# Patient Record
Sex: MALE | Race: WHITE | Employment: OTHER | URBAN - METROPOLITAN AREA
[De-identification: names, ages, dates, MRNs, and addresses within clinical notes are randomized per-mention and may not be internally consistent; named-entity substitution may affect disease eponyms.]

---

## 2017-05-17 ENCOUNTER — APPOINTMENT (OUTPATIENT)
Dept: CT IMAGING | Age: 62
End: 2017-05-17
Attending: EMERGENCY MEDICINE
Payer: OTHER MISCELLANEOUS

## 2017-05-17 ENCOUNTER — HOSPITAL ENCOUNTER (EMERGENCY)
Age: 62
Discharge: HOME | End: 2017-05-17
Attending: EMERGENCY MEDICINE
Payer: OTHER MISCELLANEOUS

## 2017-05-17 VITALS
RESPIRATION RATE: 20 BRPM | HEIGHT: 68 IN | OXYGEN SATURATION: 96 % | BODY MASS INDEX: 28.79 KG/M2 | TEMPERATURE: 98.3 F | SYSTOLIC BLOOD PRESSURE: 126 MMHG | DIASTOLIC BLOOD PRESSURE: 62 MMHG | WEIGHT: 190 LBS | HEART RATE: 86 BPM

## 2017-05-17 DIAGNOSIS — S39.012A BACK STRAIN, INITIAL ENCOUNTER: ICD-10-CM
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
## 2017-05-17 DIAGNOSIS — S20.222A BACK CONTUSION, LEFT, INITIAL ENCOUNTER: ICD-10-CM

## 2017-05-17 LAB
ALBUMIN SERPL BCP-MCNC: 4.1 G/DL (ref 3.2–4.6)
ALBUMIN/GLOB SERPL: 1.3 {RATIO} (ref 1.2–3.5)
ALP SERPL-CCNC: 56 U/L (ref 50–136)
ALT SERPL-CCNC: 36 U/L (ref 12–65)
ANION GAP BLD CALC-SCNC: 9 MMOL/L (ref 7–16)
AST SERPL W P-5'-P-CCNC: 24 U/L (ref 15–37)
ATRIAL RATE: 105 BPM
BASOPHILS # BLD AUTO: 0 K/UL (ref 0–0.2)
BASOPHILS # BLD: 0 % (ref 0–2)
BILIRUB SERPL-MCNC: 1 MG/DL (ref 0.2–1.1)
BUN SERPL-MCNC: 21 MG/DL (ref 8–23)
CALCIUM SERPL-MCNC: 9.5 MG/DL (ref 8.3–10.4)
CALCULATED P AXIS, ECG09: 64 DEGREES
CALCULATED R AXIS, ECG10: 68 DEGREES
CALCULATED T AXIS, ECG11: 67 DEGREES
CHLORIDE SERPL-SCNC: 106 MMOL/L (ref 98–107)
CO2 SERPL-SCNC: 28 MMOL/L (ref 21–32)
CREAT SERPL-MCNC: 1.04 MG/DL (ref 0.8–1.5)
DIAGNOSIS, 93000: NORMAL
DIFFERENTIAL METHOD BLD: ABNORMAL
EOSINOPHIL # BLD: 0.2 K/UL (ref 0–0.8)
EOSINOPHIL NFR BLD: 3 % (ref 0.5–7.8)
ERYTHROCYTE [DISTWIDTH] IN BLOOD BY AUTOMATED COUNT: 12.8 % (ref 11.9–14.6)
GLOBULIN SER CALC-MCNC: 3.2 G/DL (ref 2.3–3.5)
GLUCOSE SERPL-MCNC: 220 MG/DL (ref 65–100)
HCT VFR BLD AUTO: 42.4 % (ref 41.1–50.3)
HGB BLD-MCNC: 15 G/DL (ref 13.6–17.2)
IMM GRANULOCYTES # BLD: 0 K/UL (ref 0–0.5)
IMM GRANULOCYTES NFR BLD AUTO: 0.4 % (ref 0–5)
INR PPP: 0.9 (ref 0.9–1.2)
LYMPHOCYTES # BLD AUTO: 27 % (ref 13–44)
LYMPHOCYTES # BLD: 1.9 K/UL (ref 0.5–4.6)
MCH RBC QN AUTO: 31.1 PG (ref 26.1–32.9)
MCHC RBC AUTO-ENTMCNC: 35.4 G/DL (ref 31.4–35)
MCV RBC AUTO: 87.8 FL (ref 79.6–97.8)
MONOCYTES # BLD: 0.4 K/UL (ref 0.1–1.3)
MONOCYTES NFR BLD AUTO: 6 % (ref 4–12)
NEUTS SEG # BLD: 4.3 K/UL (ref 1.7–8.2)
NEUTS SEG NFR BLD AUTO: 64 % (ref 43–78)
P-R INTERVAL, ECG05: 122 MS
PLATELET # BLD AUTO: 239 K/UL (ref 150–450)
PMV BLD AUTO: 10.4 FL (ref 10.8–14.1)
POTASSIUM SERPL-SCNC: 4.2 MMOL/L (ref 3.5–5.1)
PROT SERPL-MCNC: 7.3 G/DL (ref 6.3–8.2)
PROTHROMBIN TIME: 10.3 SEC (ref 9.6–12)
Q-T INTERVAL, ECG07: 336 MS
QRS DURATION, ECG06: 90 MS
QTC CALCULATION (BEZET), ECG08: 444 MS
RBC # BLD AUTO: 4.83 M/UL (ref 4.23–5.67)
SODIUM SERPL-SCNC: 143 MMOL/L (ref 136–145)
VENTRICULAR RATE, ECG03: 105 BPM
WBC # BLD AUTO: 6.9 K/UL (ref 4.3–11.1)

## 2017-05-17 PROCEDURE — 81003 URINALYSIS AUTO W/O SCOPE: CPT | Performed by: EMERGENCY MEDICINE

## 2017-05-17 PROCEDURE — 96361 HYDRATE IV INFUSION ADD-ON: CPT | Performed by: EMERGENCY MEDICINE

## 2017-05-17 PROCEDURE — 74011250636 HC RX REV CODE- 250/636: Performed by: EMERGENCY MEDICINE

## 2017-05-17 PROCEDURE — 96375 TX/PRO/DX INJ NEW DRUG ADDON: CPT | Performed by: EMERGENCY MEDICINE

## 2017-05-17 PROCEDURE — 96374 THER/PROPH/DIAG INJ IV PUSH: CPT | Performed by: EMERGENCY MEDICINE

## 2017-05-17 PROCEDURE — 74011636320 HC RX REV CODE- 636/320: Performed by: EMERGENCY MEDICINE

## 2017-05-17 PROCEDURE — 74011250636 HC RX REV CODE- 250/636

## 2017-05-17 PROCEDURE — 80053 COMPREHEN METABOLIC PANEL: CPT | Performed by: EMERGENCY MEDICINE

## 2017-05-17 PROCEDURE — 85610 PROTHROMBIN TIME: CPT | Performed by: EMERGENCY MEDICINE

## 2017-05-17 PROCEDURE — 74011000258 HC RX REV CODE- 258: Performed by: EMERGENCY MEDICINE

## 2017-05-17 PROCEDURE — 85025 COMPLETE CBC W/AUTO DIFF WBC: CPT | Performed by: EMERGENCY MEDICINE

## 2017-05-17 PROCEDURE — 99285 EMERGENCY DEPT VISIT HI MDM: CPT | Performed by: EMERGENCY MEDICINE

## 2017-05-17 PROCEDURE — 93005 ELECTROCARDIOGRAM TRACING: CPT

## 2017-05-17 PROCEDURE — 71260 CT THORAX DX C+: CPT

## 2017-05-17 RX ORDER — LIDOCAINE 50 MG/G
PATCH TOPICAL
Qty: 15 EACH | Refills: 0 | Status: SHIPPED | OUTPATIENT
Start: 2017-05-17 | End: 2018-08-06

## 2017-05-17 RX ORDER — MORPHINE SULFATE 4 MG/ML
4 INJECTION, SOLUTION INTRAMUSCULAR; INTRAVENOUS
Status: COMPLETED | OUTPATIENT
Start: 2017-05-17 | End: 2017-05-17

## 2017-05-17 RX ORDER — SODIUM CHLORIDE 0.9 % (FLUSH) 0.9 %
10 SYRINGE (ML) INJECTION
Status: COMPLETED | OUTPATIENT
Start: 2017-05-17 | End: 2017-05-17

## 2017-05-17 RX ORDER — CYCLOBENZAPRINE HCL 10 MG
10 TABLET ORAL
Qty: 21 TAB | Refills: 0 | Status: SHIPPED | OUTPATIENT
Start: 2017-05-17 | End: 2018-08-06

## 2017-05-17 RX ORDER — DIAZEPAM 10 MG/2ML
5 INJECTION INTRAMUSCULAR
Status: COMPLETED | OUTPATIENT
Start: 2017-05-17 | End: 2017-05-17

## 2017-05-17 RX ADMIN — DIAZEPAM 5 MG: 5 INJECTION, SOLUTION INTRAMUSCULAR; INTRAVENOUS at 16:48

## 2017-05-17 RX ADMIN — MORPHINE SULFATE 4 MG: 4 INJECTION, SOLUTION INTRAMUSCULAR; INTRAVENOUS at 16:49

## 2017-05-17 RX ADMIN — Medication 10 ML: at 17:16

## 2017-05-17 RX ADMIN — SODIUM CHLORIDE 100 ML: 900 INJECTION, SOLUTION INTRAVENOUS at 17:16

## 2017-05-17 RX ADMIN — IOPAMIDOL 100 ML: 755 INJECTION, SOLUTION INTRAVENOUS at 17:16

## 2017-05-17 RX ADMIN — SODIUM CHLORIDE 1000 ML: 900 INJECTION, SOLUTION INTRAVENOUS at 16:49

## 2017-05-17 NOTE — ED TRIAGE NOTES
Patient arrives [employer] forklift hit him in lower back and knocked off feet. States tingling down left leg. Patient able to stand with assistance turn and sit on [facility] stretcher. Employees placed patient in chair.  bp-162/84 hr-110-120 98% on room air [id number]

## 2017-05-17 NOTE — Clinical Note
Expect to be stiff and sore the next couple of days If symptoms continue, call and arrange follow-up with spine center Take medications as prescribed

## 2017-05-17 NOTE — ED NOTES
Patient is resting on stretcher. Patient is on cardiac monitor, cycling vital signs, and continuous pulse ox. Patient denies needs at this time. Stretcher in low position. Side rails x 2 for safety. Call light within reach. Will continue to monitor. Bilateral pedal pulses are 2+ and marked.

## 2017-05-17 NOTE — ED NOTES
Patient returned from radiology. Patient is resting on stretcher. Patient is on cardiac monitor, cycling vital signs, and continuous pulse ox. Patient denies needs at this time. Stretcher in low position. Side rails x 2 for safety. Call light within reach. Will continue to monitor.

## 2017-05-17 NOTE — DISCHARGE INSTRUCTIONS
Back Strain: Care Instructions  Your Care Instructions    Back strain happens when you overstretch, or pull, a muscle in your back. You may hurt your back in an accident or when you exercise or lift something. Most back pain will get better with rest and time. You can take care of yourself at home to help your back heal.  Follow-up care is a key part of your treatment and safety. Be sure to make and go to all appointments, and call your doctor if you are having problems. It's also a good idea to know your test results and keep a list of the medicines you take. How can you care for yourself at home? · Try to stay as active as you can, but stop or reduce any activity that causes pain. · Put ice or a cold pack on the sore muscle for 10 to 20 minutes at a time to stop swelling. Try this every 1 to 2 hours for 3 days (when you are awake) or until the swelling goes down. Put a thin cloth between the ice pack and your skin. · After 2 or 3 days, apply a heating pad on low or a warm cloth to your back. Some doctors suggest that you go back and forth between hot and cold treatments. · Take pain medicines exactly as directed. ¨ If the doctor gave you a prescription medicine for pain, take it as prescribed. ¨ If you are not taking a prescription pain medicine, ask your doctor if you can take an over-the-counter medicine. · Try sleeping on your side with a pillow between your legs. Or put a pillow under your knees when you lie on your back. These measures can ease pain in your lower back. · Return to your usual level of activity slowly. When should you call for help? Call 911 anytime you think you may need emergency care. For example, call if:  · You are unable to move a leg at all. Call your doctor now or seek immediate medical care if:  · You have new or worse symptoms in your legs, belly, or buttocks. Symptoms may include:  ¨ Numbness or tingling. ¨ Weakness. ¨ Pain.   · You lose bladder or bowel control. Watch closely for changes in your health, and be sure to contact your doctor if you are not getting better as expected. Where can you learn more? Go to http://collette-rhys.info/. Enter X878 in the search box to learn more about \"Back Strain: Care Instructions. \"  Current as of: May 23, 2016  Content Version: 11.2  © 3533-4114 [employer]. Care instructions adapted under license by [employer] (which disclaims liability or warranty for this information). If you have questions about a medical condition or this instruction, always ask your healthcare professional. [place] [address] any warranty or liability for your use of this information. Preventing Falls: Care Instructions  Your Care Instructions  Getting around your home safely can be a challenge if you have injuries or health problems that make it easy for you to fall. Loose rugs and furniture in walkways are among the dangers for many older people who have problems walking or who have poor eyesight. People who have conditions such as arthritis, osteoporosis, or dementia also have to be careful not to fall. You can make your home safer with a few simple measures. Follow-up care is a key part of your treatment and safety. Be sure to make and go to all appointments, and call your doctor if you are having problems. It's also a good idea to know your test results and keep a list of the medicines you take. How can you care for yourself at home? Taking care of yourself  · You may get dizzy if you do not drink enough water. To prevent dehydration, drink plenty of fluids, enough so that your urine is light yellow or clear like water. Choose water and other caffeine-free clear liquids. If you have kidney, heart, or liver disease and have to limit fluids, talk with your doctor before you increase the amount of fluids you drink.   · Exercise regularly to improve your strength, muscle tone, and balance. Walk if you can. Swimming may be a good choice if you cannot walk easily. · Have your vision and hearing checked each year or any time you notice a change. If you have trouble seeing and hearing, you might not be able to avoid objects and could lose your balance. · Know the side effects of the medicines you take. Ask your doctor or pharmacist whether the medicines you take can affect your balance. Sleeping pills or sedatives can affect your balance. · Limit the amount of alcohol you drink. Alcohol can impair your balance and other senses. · Ask your doctor whether calluses or corns on your feet need to be removed. If you wear loose-fitting shoes because of calluses or corns, you can lose your balance and fall. · Talk to your doctor if you have numbness in your feet. Preventing falls at home  · Remove raised doorway thresholds, throw rugs, and clutter. Repair loose carpet or raised areas in the floor. · Move furniture and electrical cords to keep them out of walking paths. · Use nonskid floor wax, and wipe up spills right away, especially on ceramic tile floors. · If you use a walker or cane, put rubber tips on it. If you use crutches, clean the bottoms of them regularly with an abrasive pad, such as steel wool. · Keep your house well lit, especially Marshel Fess, and outside walkways. Use night-lights in areas such as hallways and bathrooms. Add extra light switches or use remote switches (such as switches that go on or off when you clap your hands) to make it easier to turn lights on if you have to get up during the night. · Install sturdy handrails on stairways. · Move items in your cabinets so that the things you use a lot are on the lower shelves (about waist level). · Keep a cordless phone and a flashlight with new batteries by your bed. If possible, put a phone in each of the main rooms of your house, or carry a cell phone in case you fall and cannot reach a phone.  Or, you can wear a device around your neck or wrist. You push a button that sends a signal for help. · Wear low-heeled shoes that fit well and give your feet good support. Use footwear with nonskid soles. Check the heels and soles of your shoes for wear. Repair or replace worn heels or soles. · Do not wear socks without shoes on wood floors. · Walk on the grass when the sidewalks are slippery. If you live in an area that gets snow and ice in the winter, sprinkle salt on slippery steps and sidewalks. Preventing falls in the bath  · Install grab bars and nonskid mats inside and outside your shower or tub and near the toilet and sinks. · Use shower chairs and bath benches. · Use a hand-held shower head that will allow you to sit while showering. · Get into a tub or shower by putting the weaker leg in first. Get out of a tub or shower with your strong side first.  · Repair loose toilet seats and consider installing a raised toilet seat to make getting on and off the toilet easier. · Keep your bathroom door unlocked while you are in the shower. Where can you learn more? Go to http://collette-rhys.info/. Enter 0476 [id number] 69 [id number] in the search box to learn more about \"Preventing Falls: Care Instructions. \"  Current as of: August 4, 2016  Content Version: 11.2  © 5688-9035 [employer]. Care instructions adapted under license by [employer] (which disclaims liability or warranty for this information). If you have questions about a medical condition or this instruction, always ask your healthcare professional. [place] [address] any warranty or liability for your use of this information. Learning About How to Have a Healthy Back  What causes back pain? Back pain is often caused by overuse, strain, or injury. For example, people often hurt their backs playing sports or working in the yard, being jolted in a car accident, or lifting something too heavy. Aging plays a part too.  Your bones and muscles tend to lose strength as you age, which makes injury more likely. The spongy discs between the bones of the spine (vertebrae) may suffer from wear and tear and no longer provide enough cushion between the bones. A disc that bulges or breaks open (herniated disc) can press on nerves, causing back pain. In some people, back pain is the result of arthritis, broken vertebrae caused by bone loss (osteoporosis), illness, or a spine problem. Although most people have back pain at one time or another, there are steps you can take to make it less likely. How can you have a healthy back? Reduce stress on your back through good posture  Slumping or slouching alone may not cause low back pain. But after the back has been strained or injured, bad posture can make pain worse. · Sleep in a position that maintains your back's normal curves and on a mattress that feels comfortable. Sleep on your side with a pillow between your knees, or sleep on your back with a pillow under your knees. These positions can reduce strain on your back. · Stand and sit up straight. \"Good posture\" generally means your ears, shoulders, and hips are in a straight line. · If you must stand for a long time, put one foot on a stool, ledge, or box. Switch feet every now and then. · Sit in a chair that is low enough to let you place both feet flat on the floor with both knees nearly level with your hips. If your chair or desk is too high, use a footrest to raise your knees. Place a small pillow, a rolled-up towel, or a lumbar roll in the curve of your back if you need extra support. · Try a kneeling chair, which helps tilt your hips forward. This takes pressure off your lower back. · Try sitting on an exercise ball. It can rock from side to side, which helps keep your back loose. · When driving, keep your knees nearly level with your hips. Sit straight, and drive with both hands on the steering wheel.  Your arms should be in a slightly bent position. Reduce stress on your back through careful lifting  · Squat down, bending at the hips and knees only. If you need to, put one knee to the floor and extend your other knee in front of you, bent at a right angle (half kneeling). · Press your chest straight forward. This helps keep your upper back straight while keeping a slight arch in your low back. · Hold the load as close to your body as possible, at the level of your belly button (navel). · Use your feet to change direction, taking small steps. · Lead with your hips as you change direction. Keep your shoulders in line with your hips as you move. · Set down your load carefully, squatting with your knees and hips only. Exercise and stretch your back  · Do some exercise on most days of the week, if your doctor says it is okay. You can walk, run, swim, or cycle. · Stretch your back muscles. Here are a few exercises to try:  Corinne Orn on your back, and gently pull one bent knee to your chest. Put that foot back on the floor, and then pull the other knee to your chest.  ¨ Do pelvic tilts. Lie on your back with your knees bent. Tighten your stomach muscles. Pull your belly button (navel) in and up toward your ribs. You should feel like your back is pressing to the floor and your hips and pelvis are slightly lifting off the floor. Hold for 6 seconds while breathing smoothly. ¨ Sit with your back flat against a wall. · Keep your core muscles strong. The muscles of your back, belly (abdomen), and buttocks support your spine. ¨ Pull in your belly and imagine pulling your navel toward your spine. Hold this for 6 seconds, then relax. Remember to keep breathing normally as you tense your muscles. ¨ Do curl-ups. Always do them with your knees bent. Keep your low back on the floor, and curl your shoulders toward your knees using a smooth, slow motion.  Keep your arms folded across your chest. If this bothers your neck, try putting your hands behind your neck (not your head), with your elbows spread apart. ¨ Lie on your back with your knees bent and your feet flat on the floor. Tighten your belly muscles, and then push with your feet and raise your buttocks up a few inches. Hold this position 6 seconds as you continue to breathe normally, then lower yourself slowly to the floor. Repeat 8 to 12 times. ¨ If you like group exercise, try Pilates or yoga. These classes have poses that strengthen the core muscles. Lead a healthy lifestyle  · Stay at a healthy weight to avoid strain on your back. · Do not smoke. Smoking increases the risk of osteoporosis, which weakens the spine. If you need help quitting, talk to your doctor about stop-smoking programs and medicines. These can increase your chances of quitting for good. Where can you learn more? Go to http://collette-rhys.info/. Enter L315 in the search box to learn more about \"Learning About How to Have a Healthy Back. \"  Current as of: May 23, 2016  Content Version: 11.2  © 9872-0202 [employer]. Care instructions adapted under license by [employer] (which disclaims liability or warranty for this information). If you have questions about a medical condition or this instruction, always ask your healthcare professional. [address] any warranty or liability for your use of this information.
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
## 2017-05-17 NOTE — LETTER
NOTIFICATION RETURN TO WORK / SCHOOL 
 
5/17/2017 7:07 PM 
 
Mr. Dahlia Chung [address] [place] [address] To Whom It May Concern: 
 
Dahlia Chung is currently under the care of [facility] EMERGENCY DEPT. He will return to work/school on: 5/19/2017 If there are questions or concerns please have the patient contact our office. Sincerely, Amanda Ramsey RN

## 2017-05-17 NOTE — ED NOTES
Discharge instructions, follow up, and prescriptions provided and explained to the pt, wife, and daughter. Opportunity for questions provided. A signed copy of AVS was placed in chart. Coco García

## 2017-05-23 ENCOUNTER — HOSPITAL ENCOUNTER (OUTPATIENT)
Dept: OCCUPATIONAL MEDICINE | Age: 62
Discharge: HOME | End: 2017-05-23

## 2017-05-23 DIAGNOSIS — T14.90XA INJURY, OTHER AND UNSPECIFIED, UNSPECIFIED SITE: ICD-10-CM

## 2017-12-18 ENCOUNTER — HOSPITAL ENCOUNTER (EMERGENCY)
Age: 62
Discharge: HOME | End: 2017-12-18
Attending: EMERGENCY MEDICINE
Payer: COMMERCIAL

## 2017-12-18 ENCOUNTER — APPOINTMENT (OUTPATIENT)
Dept: GENERAL RADIOLOGY | Age: 62
End: 2017-12-18
Attending: EMERGENCY MEDICINE
Payer: COMMERCIAL

## 2017-12-18 VITALS
SYSTOLIC BLOOD PRESSURE: 131 MMHG | HEIGHT: 68 IN | RESPIRATION RATE: 18 BRPM | DIASTOLIC BLOOD PRESSURE: 79 MMHG | TEMPERATURE: 98 F | OXYGEN SATURATION: 95 % | WEIGHT: 180 LBS | HEART RATE: 81 BPM | BODY MASS INDEX: 27.28 KG/M2

## 2017-12-18 DIAGNOSIS — R53.1 GENERALIZED WEAKNESS: ICD-10-CM

## 2017-12-18 DIAGNOSIS — R11.2 NAUSEA AND VOMITING, INTRACTABILITY OF VOMITING NOT SPECIFIED, UNSPECIFIED VOMITING TYPE: Primary | ICD-10-CM

## 2017-12-18 LAB
ALBUMIN SERPL-MCNC: 4.1 G/DL (ref 3.2–4.6)
ALBUMIN/GLOB SERPL: 1.2 {RATIO} (ref 1.2–3.5)
ALP SERPL-CCNC: 54 U/L (ref 50–136)
ALT SERPL-CCNC: 32 U/L (ref 12–65)
ANION GAP SERPL CALC-SCNC: 16 MMOL/L (ref 7–16)
AST SERPL-CCNC: 17 U/L (ref 15–37)
ATRIAL RATE: 76 BPM
BASOPHILS # BLD: 0 K/UL (ref 0–0.2)
BASOPHILS NFR BLD: 0 % (ref 0–2)
BILIRUB SERPL-MCNC: 0.9 MG/DL (ref 0.2–1.1)
BUN SERPL-MCNC: 19 MG/DL (ref 8–23)
CALCIUM SERPL-MCNC: 8.6 MG/DL (ref 8.3–10.4)
CALCULATED P AXIS, ECG09: 39 DEGREES
CALCULATED R AXIS, ECG10: 53 DEGREES
CALCULATED T AXIS, ECG11: 26 DEGREES
CHLORIDE SERPL-SCNC: 102 MMOL/L (ref 98–107)
CO2 SERPL-SCNC: 19 MMOL/L (ref 21–32)
CREAT SERPL-MCNC: 0.79 MG/DL (ref 0.8–1.5)
D DIMER PPP FEU-MCNC: 0.4 UG/ML(FEU)
DIAGNOSIS, 93000: NORMAL
DIFFERENTIAL METHOD BLD: ABNORMAL
EOSINOPHIL # BLD: 0.2 K/UL (ref 0–0.8)
EOSINOPHIL NFR BLD: 2 % (ref 0.5–7.8)
ERYTHROCYTE [DISTWIDTH] IN BLOOD BY AUTOMATED COUNT: 12.9 % (ref 11.9–14.6)
GLOBULIN SER CALC-MCNC: 3.4 G/DL (ref 2.3–3.5)
GLUCOSE SERPL-MCNC: 248 MG/DL (ref 65–100)
HCT VFR BLD AUTO: 44.2 % (ref 41.1–50.3)
HGB BLD-MCNC: 15.6 G/DL (ref 13.6–17.2)
IMM GRANULOCYTES # BLD: 0 K/UL (ref 0–0.5)
IMM GRANULOCYTES NFR BLD AUTO: 0 % (ref 0–5)
LIPASE SERPL-CCNC: 191 U/L (ref 73–393)
LYMPHOCYTES # BLD: 2.2 K/UL (ref 0.5–4.6)
LYMPHOCYTES NFR BLD: 29 % (ref 13–44)
MCH RBC QN AUTO: 32.1 PG (ref 26.1–32.9)
MCHC RBC AUTO-ENTMCNC: 35.3 G/DL (ref 31.4–35)
MCV RBC AUTO: 90.9 FL (ref 79.6–97.8)
MONOCYTES # BLD: 0.4 K/UL (ref 0.1–1.3)
MONOCYTES NFR BLD: 6 % (ref 4–12)
NEUTS SEG # BLD: 4.9 K/UL (ref 1.7–8.2)
NEUTS SEG NFR BLD: 63 % (ref 43–78)
P-R INTERVAL, ECG05: 132 MS
PLATELET # BLD AUTO: 258 K/UL (ref 150–450)
PMV BLD AUTO: 10.2 FL (ref 10.8–14.1)
POTASSIUM SERPL-SCNC: 4 MMOL/L (ref 3.5–5.1)
PROT SERPL-MCNC: 7.5 G/DL (ref 6.3–8.2)
Q-T INTERVAL, ECG07: 382 MS
QRS DURATION, ECG06: 94 MS
QTC CALCULATION (BEZET), ECG08: 429 MS
RBC # BLD AUTO: 4.86 M/UL (ref 4.23–5.67)
SODIUM SERPL-SCNC: 137 MMOL/L (ref 136–145)
TROPONIN I BLD-MCNC: 0 NG/ML (ref 0.02–0.05)
TROPONIN I SERPL-MCNC: <0.02 NG/ML (ref 0.02–0.05)
VENTRICULAR RATE, ECG03: 76 BPM
WBC # BLD AUTO: 7.6 K/UL (ref 4.3–11.1)

## 2017-12-18 PROCEDURE — 83690 ASSAY OF LIPASE: CPT | Performed by: EMERGENCY MEDICINE

## 2017-12-18 PROCEDURE — 85025 COMPLETE CBC W/AUTO DIFF WBC: CPT | Performed by: EMERGENCY MEDICINE

## 2017-12-18 PROCEDURE — 84484 ASSAY OF TROPONIN QUANT: CPT | Performed by: EMERGENCY MEDICINE

## 2017-12-18 PROCEDURE — 96376 TX/PRO/DX INJ SAME DRUG ADON: CPT | Performed by: EMERGENCY MEDICINE

## 2017-12-18 PROCEDURE — 85379 FIBRIN DEGRADATION QUANT: CPT | Performed by: EMERGENCY MEDICINE

## 2017-12-18 PROCEDURE — 74011250636 HC RX REV CODE- 250/636: Performed by: EMERGENCY MEDICINE

## 2017-12-18 PROCEDURE — 71020 XR CHEST PA LAT: CPT

## 2017-12-18 PROCEDURE — 80053 COMPREHEN METABOLIC PANEL: CPT | Performed by: EMERGENCY MEDICINE

## 2017-12-18 PROCEDURE — 99284 EMERGENCY DEPT VISIT MOD MDM: CPT | Performed by: EMERGENCY MEDICINE

## 2017-12-18 PROCEDURE — 96374 THER/PROPH/DIAG INJ IV PUSH: CPT | Performed by: EMERGENCY MEDICINE

## 2017-12-18 PROCEDURE — 93005 ELECTROCARDIOGRAM TRACING: CPT | Performed by: EMERGENCY MEDICINE

## 2017-12-18 RX ORDER — ONDANSETRON 2 MG/ML
4 INJECTION INTRAMUSCULAR; INTRAVENOUS
Status: COMPLETED | OUTPATIENT
Start: 2017-12-18 | End: 2017-12-18

## 2017-12-18 RX ORDER — ONDANSETRON 4 MG/1
4 TABLET, ORALLY DISINTEGRATING ORAL
Qty: 6 TAB | Refills: 0 | Status: SHIPPED | OUTPATIENT
Start: 2017-12-18 | End: 2018-08-06

## 2017-12-18 RX ORDER — PROMETHAZINE HYDROCHLORIDE 25 MG/1
25 TABLET ORAL
Qty: 12 TAB | Refills: 0 | Status: SHIPPED | OUTPATIENT
Start: 2017-12-18 | End: 2018-08-16

## 2017-12-18 RX ORDER — RANITIDINE 150 MG/1
150 TABLET, FILM COATED ORAL 2 TIMES DAILY
Qty: 30 TAB | Refills: 0 | Status: SHIPPED | OUTPATIENT
Start: 2017-12-18 | End: 2018-01-17

## 2017-12-18 RX ADMIN — ONDANSETRON 4 MG: 2 INJECTION INTRAMUSCULAR; INTRAVENOUS at 01:48

## 2017-12-18 RX ADMIN — ONDANSETRON 4 MG: 2 INJECTION INTRAMUSCULAR; INTRAVENOUS at 02:30

## 2017-12-18 NOTE — ED PROVIDER NOTES
HPI Comments: 65 on gentle and has a remote history of coronary artery disease, insulin dependent diabetes, hypothyroidism. He noticed nausea and lightheadedness and dizziness along with a general feeling of malaise and weakness when he went to bed. One episode of vomiting. Had some belching and felt like he tasted acid. No chest pain headache or abdominal pain. No diarrhea or dark colored bowel movements. No dysuria. No focal numbness or weakness. No syncope. No vertigo. His had no cough but does feel like he is short of breath. Has had some subjective fever and chills. Patient is a 65 y.o. male presenting with fatigue. The history is provided by the patient. Fatigue   This is a new problem. The current episode started 1 to 2 hours ago. The problem has not changed since onset. There was no focality noted. Pertinent negatives include no focal weakness, no loss of sensation, no loss of balance, no slurred speech and no speech difficulty. Patient reports a subjective fever - was not measured. Associated symptoms include shortness of breath, vomiting and nausea. Pertinent negatives include no chest pain and no headaches. Past Medical History:   Diagnosis Date    Benign hypertensive heart disease without heart failure 1/8/2016    Coronary atherosclerosis of native coronary vessel 1/8/2016    [facility] [address]     DM type 2 (diabetes mellitus, type 2) ([facility] [address] 1/8/2016    Dyspnea     Hypertension        Past Surgical History:   Procedure Laterality Date    ABDOMEN SURGERY PROC UNLISTED      GALLBLADDER    CARDIAC SURG PROCEDURE UNLIST      stents in 2009.     HX CHOLECYSTECTOMY           Family History:   Problem Relation Age of Onset    Heart Attack Mother     Heart Attack Father     Coronary Artery Disease Other      FAMILY HX       Social History     Social History    Marital status:      Spouse name: N/A    Number of children: N/A    Years of education: N/A     Occupational History  Not on file. Social History Main Topics    Smoking status: Never Smoker    Smokeless tobacco: Never Used    Alcohol use No    Drug use: Not on file    Sexual activity: Not on file     Other Topics Concern    Not on file     Social History Narrative         ALLERGIES: Plavix [clopidogrel]    Review of Systems   Constitutional: Positive for fatigue. Negative for chills and fever. Respiratory: Positive for shortness of breath. Negative for cough. Cardiovascular: Negative for chest pain and palpitations. Gastrointestinal: Positive for nausea and vomiting. Negative for abdominal pain and diarrhea. Genitourinary: Negative for dysuria and flank pain. Musculoskeletal: Negative for back pain and neck pain. Skin: Negative for color change and rash. Neurological: Negative for focal weakness, syncope, speech difficulty, headaches and loss of balance. All other systems reviewed and are negative. Vitals:    12/18/17 0018   BP: 157/74   Pulse: 79   Resp: 20   Temp: 97.7 °F (36.5 °C)   SpO2: 98%   Weight: 81.6 kg (180 lb)   Height: 5' 8\" (1.727 m)            Physical Exam   Constitutional: He is oriented to person, place, and time. He appears well-developed and well-nourished. No distress. HENT:   Head: Normocephalic and atraumatic. Mouth/Throat: Oropharynx is clear and moist. No oropharyngeal exudate. Eyes: Conjunctivae and EOM are normal. Pupils are equal, round, and reactive to light. Neck: Normal range of motion. Neck supple. Cardiovascular: Normal rate, regular rhythm and intact distal pulses. No murmur heard. Pulmonary/Chest: Breath sounds normal. No respiratory distress. Abdominal: Soft. Bowel sounds are normal. He exhibits no mass. There is no tenderness. There is no rebound and no guarding. No hernia. Neurological: He is alert and oriented to person, place, and time. Gait normal.   Nl speech  No nystagmus or drift. Ambulatory   Skin: Skin is warm and dry.    Psychiatric: He has a normal mood and affect. His speech is normal.   Nursing note and vitals reviewed. MDM  Number of Diagnoses or Management Options  Diagnosis management comments: Suspect viral syndrome. Patient has had some shortness of breath and with the near syncopal episode, will check d-dimer. Check EKG and troponin. Amount and/or Complexity of Data Reviewed  Clinical lab tests: ordered and reviewed  Tests in the radiology section of CPT®: ordered and reviewed  Tests in the medicine section of CPT®: ordered and reviewed  Review and summarize past medical records: yes (Cardiac catheterization with stenting of LAD in 2009)  Independent visualization of images, tracings, or specimens: yes    Risk of Complications, Morbidity, and/or Mortality  Presenting problems: moderate  Diagnostic procedures: low  Management options: moderate    Patient Progress  Patient progress: stable    ED Course       Procedures      Results Include:    Recent Results (from the past 24 hour(s))   CBC WITH AUTOMATED DIFF    Collection Time: 12/18/17 12:28 AM   Result Value Ref Range    WBC 7.6 4.3 - 11.1 K/uL    RBC 4.86 4.23 - 5.67 M/uL    HGB 15.6 13.6 - 17.2 g/dL    HCT 44.2 41.1 - 50.3 %    MCV 90.9 79.6 - 97.8 FL    MCH 32.1 26.1 - 32.9 PG    MCHC 35.3 (H) 31.4 - 35.0 g/dL    RDW 12.9 11.9 - 14.6 %    PLATELET 064 427 - 427 K/uL    MPV 10.2 (L) 10.8 - 14.1 FL    DF AUTOMATED      NEUTROPHILS 63 43 - 78 %    LYMPHOCYTES 29 13 - 44 %    MONOCYTES 6 4.0 - 12.0 %    EOSINOPHILS 2 0.5 - 7.8 %    BASOPHILS 0 0.0 - 2.0 %    IMMATURE GRANULOCYTES 0 0.0 - 5.0 %    ABS. NEUTROPHILS 4.9 1.7 - 8.2 K/UL    ABS. LYMPHOCYTES 2.2 0.5 - 4.6 K/UL    ABS. MONOCYTES 0.4 0.1 - 1.3 K/UL    ABS. EOSINOPHILS 0.2 0.0 - 0.8 K/UL    ABS. BASOPHILS 0.0 0.0 - 0.2 K/UL    ABS. IMM.  GRANS. 0.0 0.0 - 0.5 K/UL   METABOLIC PANEL, COMPREHENSIVE    Collection Time: 12/18/17 12:28 AM   Result Value Ref Range    Sodium 137 136 - 145 mmol/L    Potassium 4.0 3.5 - 5.1 mmol/L    Chloride 102 98 - 107 mmol/L    CO2 19 (L) 21 - 32 mmol/L    Anion gap 16 7 - 16 mmol/L    Glucose 248 (H) 65 - 100 mg/dL    BUN 19 8 - 23 MG/DL    Creatinine 0.79 (L) 0.8 - 1.5 MG/DL    GFR est AA >60 >60 ml/min/1.73m2    GFR est non-AA >60 >60 ml/min/1.73m2    Calcium 8.6 8.3 - 10.4 MG/DL    Bilirubin, total 0.9 0.2 - 1.1 MG/DL    ALT (SGPT) 32 12 - 65 U/L    AST (SGOT) 17 15 - 37 U/L    Alk. phosphatase 54 50 - 136 U/L    Protein, total 7.5 6.3 - 8.2 g/dL    Albumin 4.1 3.2 - 4.6 g/dL    Globulin 3.4 2.3 - 3.5 g/dL    A-G Ratio 1.2 1.2 - 3.5     TROPONIN I    Collection Time: 12/18/17 12:28 AM   Result Value Ref Range    Troponin-I, Qt. <0.02 (L) 0.02 - 0.05 NG/ML   Xr Chest Pa Lat    Result Date: 12/18/2017  Frontal and lateral views of the chest COMPARISON: none INDICATION: Pain FINDINGS: There is no focal pulmonary consolidation. No pleural effusion, pneumothorax or evidence of pulmonary edema. The cardiomediastinal contour is within normal limits. The surrounding bones are intact. IMPRESSION: Negative chest x-ray. Symptoms improving. We'll recheck second troponin but I doubt cardiac.

## 2017-12-18 NOTE — ED NOTES
I have reviewed medications, follow up provider options, and discharge instructions with the patient and spouse. The patient and spouse verbalized understanding. Copy of discharge information given to patient upon discharge. Prescription(s) given to patient. Patient discharged in no distress. Patient instructed not to drive while under influence of drowsy drugs. Patient ambulatory to waiting area. No questions at this time.

## 2017-12-18 NOTE — ED TRIAGE NOTES
Patient states that he was going to bed when he began to feel really weak. States he had several episodes of vomiting. states he checked his blood sugar and it was 200. States he still is feeling weak.

## 2017-12-18 NOTE — DISCHARGE INSTRUCTIONS
Clear liquids if nauseated. Start acid medication. Nausea medicine if needed. Recheck with your doctor in 48 hours if not improving. Recheck sooner for worse pain/fever/vomiting/bleeding           Nausea and Vomiting: Care Instructions  Your Care Instructions    When you are nauseated, you may feel weak and sweaty and notice a lot of saliva in your mouth. Nausea often leads to vomiting. Most of the time you do not need to worry about nausea and vomiting, but they can be signs of other illnesses. Two common causes of nausea and vomiting are stomach flu and food poisoning. Nausea and vomiting from viral stomach flu will usually start to improve within 24 hours. Nausea and vomiting from food poisoning may last from 12 to 48 hours. The doctor has checked you carefully, but problems can develop later. If you notice any problems or new symptoms, get medical treatment right away. Follow-up care is a key part of your treatment and safety. Be sure to make and go to all appointments, and call your doctor if you are having problems. It's also a good idea to know your test results and keep a list of the medicines you take. How can you care for yourself at home? · To prevent dehydration, drink plenty of fluids, enough so that your urine is light yellow or clear like water. Choose water and other caffeine-free clear liquids until you feel better. If you have kidney, heart, or liver disease and have to limit fluids, talk with your doctor before you increase the amount of fluids you drink. · Rest in bed until you feel better. · When you are able to eat, try clear soups, mild foods, and liquids until all symptoms are gone for 12 to 48 hours. Other good choices include dry toast, crackers, cooked cereal, and gelatin dessert, such as Jell-O. When should you call for help? Call 911 anytime you think you may need emergency care. For example, call if:  ? · You passed out (lost consciousness).    ?Call your doctor now or seek immediate medical care if:  ? · You have symptoms of dehydration, such as:  ¨ Dry eyes and a dry mouth. ¨ Passing only a little dark urine. ¨ Feeling thirstier than usual.   ? · You have new or worsening belly pain. ? · You have a new or higher fever. ? · You vomit blood or what looks like coffee grounds. ? Watch closely for changes in your health, and be sure to contact your doctor if:  ? · You have ongoing nausea and vomiting. ? · Your vomiting is getting worse. ? · Your vomiting lasts longer than 2 days. ? · You are not getting better as expected. Where can you learn more? Go to http://collette-rhys.info/. Enter 25 [id number] in the search box to learn more about \"Nausea and Vomiting: Care Instructions. \"  Current as of: March 20, 2017  Content Version: 11.4  © 4116-9925 [employer]. Care instructions adapted under license by [employer] (which disclaims liability or warranty for this information). If you have questions about a medical condition or this instruction, always ask your healthcare professional. [address] any warranty or liability for your use of this information.

## 2018-08-16 ENCOUNTER — HOSPITAL ENCOUNTER (OUTPATIENT)
Dept: LAB | Age: 63
Discharge: HOME | End: 2018-08-16
Payer: COMMERCIAL

## 2018-08-16 DIAGNOSIS — I11.9 BENIGN HYPERTENSIVE HEART DISEASE WITHOUT HEART FAILURE: ICD-10-CM

## 2018-08-16 DIAGNOSIS — I25.118 ATHEROSCLEROSIS OF NATIVE CORONARY ARTERY OF NATIVE HEART WITH STABLE ANGINA PECTORIS (HCC): ICD-10-CM

## 2018-08-16 DIAGNOSIS — R06.02 SOB (SHORTNESS OF BREATH): ICD-10-CM

## 2018-08-16 DIAGNOSIS — Z98.61 S/P PTCA (PERCUTANEOUS TRANSLUMINAL CORONARY ANGIOPLASTY): ICD-10-CM

## 2018-08-16 DIAGNOSIS — E78.5 DYSLIPIDEMIA: ICD-10-CM

## 2018-08-16 LAB
ALBUMIN SERPL-MCNC: 4.4 G/DL (ref 3.2–4.6)
ALBUMIN/GLOB SERPL: 1.4 {RATIO}
ALP SERPL-CCNC: 39 U/L (ref 50–136)
ALT SERPL-CCNC: 34 U/L (ref 12–65)
ANION GAP SERPL CALC-SCNC: 12 MMOL/L
AST SERPL-CCNC: 15 U/L (ref 15–37)
BASOPHILS # BLD: 0 K/UL
BASOPHILS NFR BLD: 0 % (ref 0–2)
BILIRUB SERPL-MCNC: 1.1 MG/DL (ref 0.2–1.1)
BUN SERPL-MCNC: 17 MG/DL (ref 8–23)
CALCIUM SERPL-MCNC: 9.2 MG/DL (ref 8.3–10.4)
CHLORIDE SERPL-SCNC: 101 MMOL/L (ref 98–107)
CHOLEST SERPL-MCNC: 204 MG/DL
CO2 SERPL-SCNC: 24 MMOL/L (ref 21–32)
CREAT SERPL-MCNC: 1 MG/DL (ref 0.8–1.5)
DIFFERENTIAL METHOD BLD: NORMAL
EOSINOPHIL # BLD: 0.2 K/UL
EOSINOPHIL NFR BLD: 2 % (ref 0.5–7.8)
ERYTHROCYTE [DISTWIDTH] IN BLOOD BY AUTOMATED COUNT: 12.3 %
GLOBULIN SER CALC-MCNC: 3.1 G/DL
GLUCOSE SERPL-MCNC: 165 MG/DL (ref 65–100)
HCT VFR BLD AUTO: 45.3 % (ref 41.1–50.3)
HDLC SERPL-MCNC: 47 MG/DL (ref 40–60)
HDLC SERPL: 4.3 {RATIO}
HGB BLD-MCNC: 15.6 G/DL (ref 13.6–17.2)
IMM GRANULOCYTES # BLD: 0 K/UL
IMM GRANULOCYTES NFR BLD AUTO: 0 % (ref 0–5)
LDLC SERPL CALC-MCNC: 83 MG/DL
LIPID PROFILE,FLP: ABNORMAL
LYMPHOCYTES # BLD: 2.4 K/UL
LYMPHOCYTES NFR BLD: 28 % (ref 13–44)
MCH RBC QN AUTO: 30.6 PG (ref 26.1–32.9)
MCHC RBC AUTO-ENTMCNC: 34.4 G/DL (ref 31.4–35)
MCV RBC AUTO: 88.8 FL (ref 79.6–97.8)
MONOCYTES # BLD: 0.6 K/UL
MONOCYTES NFR BLD: 7 % (ref 4–12)
NEUTS SEG # BLD: 5.4 K/UL
NEUTS SEG NFR BLD: 63 % (ref 43–78)
NRBC # BLD: 0 K/UL (ref 0–0.2)
PLATELET # BLD AUTO: 258 K/UL (ref 150–450)
PMV BLD AUTO: 10.2 FL (ref 9.4–12.3)
POTASSIUM SERPL-SCNC: 4 MMOL/L (ref 3.5–5.1)
PROT SERPL-MCNC: 7.5 G/DL (ref 6.3–8.2)
RBC # BLD AUTO: 5.1 M/UL (ref 4.23–5.6)
SODIUM SERPL-SCNC: 137 MMOL/L (ref 136–145)
TRIGL SERPL-MCNC: 370 MG/DL (ref 35–150)
TSH SERPL DL<=0.005 MIU/L-ACNC: 3.15 UIU/ML (ref 0.36–3.74)
VLDLC SERPL CALC-MCNC: 74 MG/DL (ref 6–23)
WBC # BLD AUTO: 8.6 K/UL (ref 4.3–11.1)

## 2018-08-16 PROCEDURE — 80053 COMPREHEN METABOLIC PANEL: CPT

## 2018-08-16 PROCEDURE — 84443 ASSAY THYROID STIM HORMONE: CPT

## 2018-08-16 PROCEDURE — 36415 COLL VENOUS BLD VENIPUNCTURE: CPT

## 2018-08-16 PROCEDURE — 85025 COMPLETE CBC W/AUTO DIFF WBC: CPT

## 2018-08-16 PROCEDURE — 80061 LIPID PANEL: CPT

## 2018-08-27 ENCOUNTER — HOSPITAL ENCOUNTER (OUTPATIENT)
Dept: CARDIAC CATH/INVASIVE PROCEDURES | Age: 63
Discharge: HOME | End: 2018-08-27
Attending: INTERNAL MEDICINE | Admitting: INTERNAL MEDICINE
Payer: COMMERCIAL

## 2018-08-27 VITALS
BODY MASS INDEX: 27.28 KG/M2 | DIASTOLIC BLOOD PRESSURE: 75 MMHG | HEART RATE: 82 BPM | WEIGHT: 180 LBS | RESPIRATION RATE: 24 BRPM | SYSTOLIC BLOOD PRESSURE: 125 MMHG | OXYGEN SATURATION: 96 % | HEIGHT: 68 IN

## 2018-08-27 LAB — GLUCOSE BLD STRIP.AUTO-MCNC: 99 MG/DL (ref 65–100)

## 2018-08-27 PROCEDURE — 74011000258 HC RX REV CODE- 258: Performed by: INTERNAL MEDICINE

## 2018-08-27 PROCEDURE — C1874 STENT, COATED/COV W/DEL SYS: HCPCS

## 2018-08-27 PROCEDURE — 77030012468 HC VLV BLEEDBK CNTRL ABBT -B

## 2018-08-27 PROCEDURE — 93458 L HRT ARTERY/VENTRICLE ANGIO: CPT

## 2018-08-27 PROCEDURE — C1769 GUIDE WIRE: HCPCS

## 2018-08-27 PROCEDURE — 74011000250 HC RX REV CODE- 250: Performed by: INTERNAL MEDICINE

## 2018-08-27 PROCEDURE — C1725 CATH, TRANSLUMIN NON-LASER: HCPCS

## 2018-08-27 PROCEDURE — 74011636320 HC RX REV CODE- 636/320: Performed by: INTERNAL MEDICINE

## 2018-08-27 PROCEDURE — C1887 CATHETER, GUIDING: HCPCS

## 2018-08-27 PROCEDURE — 74011250637 HC RX REV CODE- 250/637: Performed by: INTERNAL MEDICINE

## 2018-08-27 PROCEDURE — 82962 GLUCOSE BLOOD TEST: CPT

## 2018-08-27 PROCEDURE — 99153 MOD SED SAME PHYS/QHP EA: CPT

## 2018-08-27 PROCEDURE — 77030004534 HC CATH ANGI DX INFN CARD -A

## 2018-08-27 PROCEDURE — 92928 PRQ TCAT PLMT NTRAC ST 1 LES: CPT

## 2018-08-27 PROCEDURE — 77030015766

## 2018-08-27 PROCEDURE — 74011250636 HC RX REV CODE- 250/636

## 2018-08-27 PROCEDURE — C1894 INTRO/SHEATH, NON-LASER: HCPCS

## 2018-08-27 PROCEDURE — 99152 MOD SED SAME PHYS/QHP 5/>YRS: CPT

## 2018-08-27 PROCEDURE — 74011250636 HC RX REV CODE- 250/636: Performed by: INTERNAL MEDICINE

## 2018-08-27 PROCEDURE — 77030019569 HC BND COMPR RAD TERU -B

## 2018-08-27 RX ORDER — SODIUM CHLORIDE 9 MG/ML
75 INJECTION, SOLUTION INTRAVENOUS CONTINUOUS
Status: DISCONTINUED | OUTPATIENT
Start: 2018-08-27 | End: 2018-08-27 | Stop reason: HOSPADM

## 2018-08-27 RX ORDER — ATORVASTATIN CALCIUM 20 MG/1
20 TABLET, FILM COATED ORAL
Qty: 30 TAB | Refills: 3 | OUTPATIENT
Start: 2018-08-27 | End: 2018-08-27

## 2018-08-27 RX ORDER — HYDROCODONE BITARTRATE AND ACETAMINOPHEN 10; 325 MG/1; MG/1
1 TABLET ORAL
Status: DISCONTINUED | OUTPATIENT
Start: 2018-08-27 | End: 2018-08-27 | Stop reason: HOSPADM

## 2018-08-27 RX ORDER — MAG HYDROX/ALUMINUM HYD/SIMETH 200-200-20
30 SUSPENSION, ORAL (FINAL DOSE FORM) ORAL ONCE
Status: COMPLETED | OUTPATIENT
Start: 2018-08-27 | End: 2018-08-27

## 2018-08-27 RX ORDER — MIDAZOLAM HYDROCHLORIDE 1 MG/ML
.5-2 INJECTION, SOLUTION INTRAMUSCULAR; INTRAVENOUS
Status: DISCONTINUED | OUTPATIENT
Start: 2018-08-27 | End: 2018-08-27 | Stop reason: HOSPADM

## 2018-08-27 RX ORDER — SODIUM CHLORIDE 0.9 % (FLUSH) 0.9 %
5-10 SYRINGE (ML) INJECTION EVERY 8 HOURS
Status: DISCONTINUED | OUTPATIENT
Start: 2018-08-27 | End: 2018-08-27 | Stop reason: HOSPADM

## 2018-08-27 RX ORDER — GUAIFENESIN 100 MG/5ML
81-324 LIQUID (ML) ORAL ONCE
Status: DISCONTINUED | OUTPATIENT
Start: 2018-08-27 | End: 2018-08-27 | Stop reason: HOSPADM

## 2018-08-27 RX ORDER — MAG HYDROX/ALUMINUM HYD/SIMETH 200-200-20
30 SUSPENSION, ORAL (FINAL DOSE FORM) ORAL ONCE
Status: DISCONTINUED | OUTPATIENT
Start: 2018-08-27 | End: 2018-08-27 | Stop reason: HOSPADM

## 2018-08-27 RX ORDER — DIAZEPAM 5 MG/1
5 TABLET ORAL ONCE
Status: COMPLETED | OUTPATIENT
Start: 2018-08-27 | End: 2018-08-27

## 2018-08-27 RX ORDER — SODIUM CHLORIDE 0.9 % (FLUSH) 0.9 %
5-10 SYRINGE (ML) INJECTION AS NEEDED
Status: DISCONTINUED | OUTPATIENT
Start: 2018-08-27 | End: 2018-08-27 | Stop reason: HOSPADM

## 2018-08-27 RX ORDER — HEPARIN SODIUM 200 [USP'U]/100ML
2 INJECTION, SOLUTION INTRAVENOUS CONTINUOUS
Status: DISCONTINUED | OUTPATIENT
Start: 2018-08-27 | End: 2018-08-27 | Stop reason: HOSPADM

## 2018-08-27 RX ORDER — LIDOCAINE HYDROCHLORIDE 10 MG/ML
3-10 INJECTION INFILTRATION; PERINEURAL
Status: DISCONTINUED | OUTPATIENT
Start: 2018-08-27 | End: 2018-08-27 | Stop reason: HOSPADM

## 2018-08-27 RX ORDER — ASPIRIN 81 MG/1
81 TABLET ORAL DAILY
Status: SHIPPED | COMMUNITY
Start: 2018-08-27 | End: 2019-04-02

## 2018-08-27 RX ADMIN — HEPARIN SODIUM 2 ML: 10000 INJECTION INTRAVENOUS; SUBCUTANEOUS at 11:39

## 2018-08-27 RX ADMIN — LIDOCAINE HYDROCHLORIDE 3 ML: 10 INJECTION, SOLUTION INFILTRATION; PERINEURAL at 11:36

## 2018-08-27 RX ADMIN — ALUMINUM HYDROXIDE, MAGNESIUM HYDROXIDE, AND SIMETHICONE 30 ML: 200; 200; 20 SUSPENSION ORAL at 12:05

## 2018-08-27 RX ADMIN — MIDAZOLAM HYDROCHLORIDE 2 MG: 1 INJECTION, SOLUTION INTRAMUSCULAR; INTRAVENOUS at 11:35

## 2018-08-27 RX ADMIN — SODIUM CHLORIDE 75 ML/HR: 900 INJECTION, SOLUTION INTRAVENOUS at 09:07

## 2018-08-27 RX ADMIN — HYDROCODONE BITARTRATE AND ACETAMINOPHEN 1 TABLET: 10; 325 TABLET ORAL at 15:13

## 2018-08-27 RX ADMIN — DIAZEPAM 5 MG: 5 TABLET ORAL at 09:00

## 2018-08-27 RX ADMIN — MIDAZOLAM HYDROCHLORIDE 2 MG: 1 INJECTION, SOLUTION INTRAMUSCULAR; INTRAVENOUS at 11:49

## 2018-08-27 RX ADMIN — BIVALIRUDIN 1.75 MG/KG/HR: 250 INJECTION, POWDER, LYOPHILIZED, FOR SOLUTION INTRAVENOUS at 11:49

## 2018-08-27 RX ADMIN — IOPAMIDOL 100 ML: 755 INJECTION, SOLUTION INTRAVENOUS at 12:09

## 2018-08-27 RX ADMIN — TICAGRELOR 180 MG: 90 TABLET ORAL at 12:05

## 2018-08-27 RX ADMIN — HEPARIN SODIUM 2 UNITS/HR: 5000 INJECTION, SOLUTION INTRAVENOUS; SUBCUTANEOUS at 11:29

## 2018-08-27 NOTE — PROGRESS NOTES
Discharge instructions given per orders, voiced good understanding of post radial cath care, medications & follow up care.  Denies any questions discharged to home via wheel chair

## 2018-08-27 NOTE — PROCEDURES
Cardiac Catheterization Procedure Note    Patient ID:     Name: Warden Stockton   Medical Record Number: [id number]   YOB: 1955    Date of Procedure: 8/27/2018     Pre-procedure Diagnosis:  Typical Angina    Post-procedure Diagnosis: Coronary Artery Disease    Reason for Procedure: New Onset Angina < or = 2 Months    Blood loss less than 5 ml    Sedation. Pt received 2 mg versed and 0 mcg fentanyl for monitored conscious sedation from 11:30 to 12:05. Nurse bian    Specimen: None    No complications    No assistants    Time out, Mallampati, and ASA performed    Procedure:  After informed consent, patient was prepped and draped in the usual sterile fashion. The right wrist was infiltrated with lidocaine. The right radial artery was accessed via the modified Seldinger technique with a 6 Vietnamese sheath. 100cc Visipaque contrast were utilized for the entire procedure. no closure device used    Catheters/wires/stents. TIG 4 JL3.5 JR4 JL 3.5 GUIDE 3X15 KAYLIN STENT 3.25 X 20 nc BALLOON PROWATER    FINDINGS    Left Ventricle: 65%  LVEDP: 11    Left Main:large and widely patent    Left Anterior descending coronary artery: large caliber. prox artery good. Mid lad 80% stenosis in prior stent    diagonals large widely patent prox diag    Left Circumflex coronary artery: dominant with no disease   OMs three om vessels with no disease   Ramus patent no disease    Right coronary artery: small non dominant with minimal disease   RV branch patent   MAGGI/PDA arises off the circ.  Patent with no disease      Graft anatomy: na    Intervention if done: stent to mLAD 3x15 kaylin post with 3.25 NC    Conclusions: one vessel PCI    Recommentations: DAPT    No complications      Signed By: Nely Argueta MD

## 2018-08-27 NOTE — DISCHARGE SUMMARY
[facility] Cardiology Discharge Summary     Patient ID:  Garth Iyer  [id number]  84 y.o.  1955    Admit date: 8/27/2018    Discharge date and time:  8-    Admitting Physician: Sharon Jimenez MD     Discharge Physician: Omari Thompson PA-C/Dr. Belen Mukherjee    Admission Diagnoses: SOB (shortness of breath), chest pain    Discharge Diagnoses:   Patient Active Problem List    Diagnosis Date Noted    Dyspnea 10/12/2016    S/P PTCA/STENT 10/12/2016    Coronary atherosclerosis of native coronary vessel 01/08/2016    Benign hypertensive heart disease without heart failure 01/08/2016    DM type 2 (diabetes mellitus, type 2) ([facility] [address] 01/08/2016       Cardiology Procedures this admission:  Left heart catheterization with PCI  Consults: None    Hospital Course: Pt was admitted with complaints of chest pain and ZAMORA. Pt was scheduled for an AM Admission LHC at [facility] [place] [place] on 8-27-18. Pt came in to [facility] [place] [place] and was taken to the cath lab by Dr. Graham Lozano. Pt was found to have an 80% mLAD stenosis that was stented with a 3.0x 15 mm Josephine LEX with 0% residual stenosis. Pt tolerated the procedure well and was taken to the telemetry floor for recovery. Pt was up feeling well without any complaints of CP, SOB or palpitations. Pt's right groin cath site was clean, dry and intact without hematoma or bruit. Pt's labs were WNL. Pt was seen and examined by Dr. Graham Lozano and determined stable and ready for discharge home for same day DC. Pt was instructed on the importance of taking aspirin and Brilinta everyday without missing a dose. Pt will need to take dual antiplatelet therapy for at least one year. Pt is not on a statin because he is intolerant to Lipitor and Crestor and needs to be considered for injectable cholesterol meds at f/u appt. DISPOSITION: The patient is being discharged home in stable condition on a low saturated fat, low cholesterol and low salt diet.  Pt is instructed to advance activities as tolerated limited to fatigue or shortness of breath. Pt is instructed to do no heavy lifting, straining, stooping or squatting for 5 days. Pt is instructed to watch cath site for bleeding/oozing, if seen Pt is instructed to apply firm pressure with clean cloth and call office at [phone]. Pt is instructed to watch for signs of infection which include increasing area of redness around site, fever/hot to touch or purulent drainage. Pt is instructed not to soak in a tub bath for 1 week, but it is okay to shower. Pt is instructed to call office or return to ER for immediate evaluation of any shortness of breath or chest pain not relieved by NTG. Follow up with [facility] Card[facility] Dr. Mulu Bhagat on Monday 9/17 at 11 AM at the [facility] office  Pt is being referred to out patient cardiac rehab. Discharge Exam:   Visit Vitals    /75    Pulse 82    Resp 24    Ht 5' 8\" (1.727 m)    Wt 81.6 kg (180 lb)    SpO2 96%    BMI 27.37 kg/m2    Pt has been seen by Dr. Mulu Bhagat: see his progress note for exam details. Recent Results (from the past 24 hour(s))   GLUCOSE, POC    Collection Time: 08/27/18  9:02 AM   Result Value Ref Range    Glucose (POC) 99 65 - 100 mg/dL         Patient Instructions:   Current Discharge Medication List     Current Discharge Medication List      START taking these medications    Details   ticagrelor (BRILINTA) 90 mg tablet Take 1 Tab by mouth every twelve (12) hours every twelve (12) hours. Qty: 60 Tab, Refills: 11         CONTINUE these medications which have NOT CHANGED    Details   aspirin delayed-release 81 mg tablet Take 1 Tab by mouth daily. benazepril (LOTENSIN) 20 mg tablet Take 20 mg by mouth daily. loratadine (CLARITIN) 10 mg tablet Take 10 mg by mouth daily as needed for Allergies. gabapentin (NEURONTIN) 300 mg capsule Take 300 mg by mouth daily. empagliflozin (JARDIANCE) 25 mg tablet Take 25 mg by mouth daily.       insulin degludec (TRESIBA FLEXTOUCH U-100 SC) 46 Units by SubCUTAneous route nightly. HYDROcodone-acetaminophen (NORCO)  mg tablet Take 1 Tab by mouth three (3) times daily. levothyroxine (SYNTHROID) 100 mcg tablet Take 100 mcg by mouth Daily (before breakfast). LORazepam (ATIVAN) 1 mg tablet Take  by mouth every four (4) hours as needed for Anxiety.          STOP taking these medications       aspirin (ASPIRIN) 325 mg tablet Comments:   Reason for Stopping:               Signed:  Vale Peck PA-C  8/27/2018  3:51 PM

## 2018-08-27 NOTE — PROGRESS NOTES
TRANSFER - OUT REPORT:    [facility] Dr Cristin Espinosa  RRA  LEX LAD x 1  Versed 4 mg  Angiomax infusion to be completed in CPRU  Brilinta 180 mg  Mylanta 30 ml  TR band 8 ml  No bleeding/hematoma  Pt is alert denies complaints  VSS    Verbal report given to Taylor(name) on Chirag Cast  being transferred to CPRU(unit) for routine progression of care       Report consisted of patients Situation, Background, Assessment and   Recommendations(SBAR). Information from the following report(s) SBAR and Procedure Summary was reviewed with the receiving nurse. Lines:   Peripheral IV 08/27/18 Right Antecubital (Active)        Opportunity for questions and clarification was provided.

## 2018-08-27 NOTE — PROGRESS NOTES
Discharge Same Day as PCI Teaching    Discharge teaching completed. Patient instructed on right radial site care, including symptoms to report to MD, medication changes & Follow up Care to include:    1- Patient is being treated with 2 separate anti-platelet medications including aspirin 81mg & Brilinta 90mg. It is very important that these medications are filled today started this evening. Patient instructed on the importance of these medications & that these medications must not be stopped for any reason or without talking to the doctor first.  2-  Patient is not being discharged on a medication for cholesterol management (ie-statin) due to intolerance to these medications  3- Patient received a referral for Cardiac Rehab II, contact information was faxed to Cardiac rehab & patient given telephone number to contact [phone]) Cardiac Rehab if they have not heard from them within 10 days.

## 2018-08-27 NOTE — PROGRESS NOTES
Patient received to [address] [place] [address] # 11  Ambulatory from [facility]. Patient scheduled for [facility] today with Dr Davis Fleming. Procedure reviewed & questions answered, voiced good understanding consent obtained & placed on chart. All medications and medical history reviewed. Will prep patient per orders. Patient & family updated on plan of care. The patient has a fraility score of 3-MANAGING WELL, based on patient A&Ox3, patient able to ambulate to room without difficulty.

## 2018-08-27 NOTE — PROGRESS NOTES
Patient received to [address] [place] [address] room # 11  Ambulatory from [facility]. Patient scheduled for [facility] today with Dr Akil Davis. Procedure reviewed & questions answered, voiced good understanding consent obtained & placed on chart. All medications and medical history reviewed. Will prep patient per orders. Patient & family updated on plan of care.       The patient has a fraility score of 3-MANAGING WELL, based on patient A&Ox3, patient able to ambulate to room without

## 2018-08-27 NOTE — PROGRESS NOTES
Report received from [facility] Cath Lab RN. Procedural findings communicated. Intra procedural  medication administration reviewed. Progression of care discussed.      Patient received into CPRU [place] 4 post sheath removal.     Right Radial access site without bleeding or swelling     TR band dry and intact     Patient instructed to limit movement to right upper extremity    Routine post procedural vital signs and site assessment initiated

## 2018-08-27 NOTE — IP AVS SNAPSHOT
Maxwell Kim 
 
 
 [address] 
[phone] Patient: Sudha Light MRN: [id number] WSM:[id number] Discharge Summary 8/27/2018 Sudha Light MRN[de-identified]  [id number] Admission Information Provider Pager Service Admission Date Expected D/C Date Kaylan Valadez MD  CARDIAC CATH LAB 8/27/2018 8/27/2018 Actual LOS Patient Class 0 days OUTPATIENT Follow-up Information Follow up With Details Comments Contact Info Brandon Mcgarry MD   [address] [facility]. [address] [facility] [address] 
[phone] Kaylan Valadez MD On 9/17/2018 Follow up with [facility]iology Dr. Marshall Riley on Monday 9/17 at 11 AM at the [facility] office [address] [facility] [address] 
[phone] My Medications TAKE these medications as instructed Instructions Each Dose to Equal  
 Morning Noon Evening Bedtime  
 aspirin delayed-release 81 mg tablet Your last dose was: Your next dose is: Take 1 Tab by mouth daily. 81 mg  
    
   
   
   
  
 ATIVAN 1 mg tablet Generic drug:  LORazepam  
   
Your last dose was: Your next dose is: Take  by mouth every four (4) hours as needed for Anxiety. benazepril 20 mg tablet Commonly known as:  LOTENSIN Your last dose was: Your next dose is: Take 20 mg by mouth daily. 20 mg CLARITIN 10 mg tablet Generic drug:  loratadine Your last dose was: Your next dose is: Take 10 mg by mouth daily as needed for Allergies. 10 mg  
    
   
   
   
  
 gabapentin 300 mg capsule Commonly known as:  NEURONTIN Your last dose was: Your next dose is: Take 300 mg by mouth daily. 300 mg HYDROcodone-acetaminophen  mg tablet Commonly known as:  Farrah Leslie Your last dose was: Your next dose is: Take 1 Tab by mouth three (3) times daily. 1 Tab JARDIANCE 25 mg tablet Generic drug:  empagliflozin Your last dose was: Your next dose is: Take 25 mg by mouth daily. 25 mg  
    
   
   
   
  
 levothyroxine 100 mcg tablet Commonly known as:  SYNTHROID Your last dose was: Your next dose is: Take 100 mcg by mouth Daily (before breakfast). 100 mcg  
    
   
   
   
  
 ticagrelor 90 mg tablet Commonly known as:  [employer] Your last dose was: Your next dose is: Take 1 Tab by mouth every twelve (12) hours every twelve (12) hours. 90 mg  
    
   
   
   
  
 TRESIBA FLEXTOUCH U-100 SC Your last dose was: Your next dose is:    
   
   
 46 Units by SubCUTAneous route nightly. 46 Units Where to Get Your Medications Information on where to get these meds will be given to you by the nurse or doctor. ! Ask your nurse or doctor about these medications  
  ticagrelor 90 mg tablet General Information Please provide this summary of care documentation to your next provider. Allergies Unspecified:  Plavix [Clopidogrel] Current Immunizations  Never Reviewed No immunizations on file. Discharge Instructions Discharge Instructions Percutaneous Coronary Intervention: What to Expect at [facility] Your Recovery Percutaneous coronary intervention (PCI) is the name for procedures that are used to open a blocked coronary artery. The two most common PCI procedures are coronary angioplasty and coronary stent placement. Your groin or arm may have a bruise and feel sore for a day or two after a percutaneous coronary intervention (PCI).  You can do light activities around the house, but nothing strenuous for several days. This care sheet gives you a general idea about how long it will take for you to recover. But each person recovers at a different pace. Follow the steps below to get better as quickly as possible. How can you care for yourself at home? Activity · Do not do strenuous exercise and do not lift anything heavy until your doctor says it is okay. You can walk around the house and do light activity, such as cooking. · For 7-10 days after you go home, do not take baths. Showers are okay. · Try not to walk up stairs for the first couple of days (if the catheter was placed in the artery in your groin). · Go back to regular exercise after seen by cardiologist. Exercise is good for your heart. Get at least 30 minutes of physical activity on most days of the week. Walking is a good choice. If your doctor says it is okay, you also may want to do other activities, such as running, swimming, cycling, or playing tennis. Diet · Drink plenty of fluids to help your body flush out the dye. If you have kidney, heart, or liver disease and have to limit fluids, talk with your doctor before you increase the amount of fluids you drink. · Keep eating a heart-healthy, low-fat diet that has lots of fruits, vegetables, and whole grains. If you have not been eating this way, talk to your doctor. You also may want to talk to a dietitian. This expert can help you to learn about healthy foods and plan meals. Medicines · Your doctor may have prescribed a blood-thinning medicine like aspirin and/or Plavix. It is very important that you take these medicines daily exactly as directed in order to keep the coronary artery open and reduce your risk of a heart attack. · Call your doctor if you think you are having a problem with your medicine. Care of the catheter site · For 1 or 2 days, you may keep a bandage over the spot where the catheter was inserted if needed. Most often, the bandage may be removed at discharge. · Put ice or a cold pack on the area for 10 to 15 minutes at a time to help with soreness or swelling. Put a thin cloth between the ice and your skin. Follow-up care is a key part of your treatment and safety. Be sure to make and go to all appointments, and call your doctor if you are having problems. Its also a good idea to know your test results. Keep an updated list of your medicines to show all medical providers. When should you call for help? Call 911 anytime you think you may need emergency care. For example, call if: 
· You pass out (lose consciousness). · You have severe trouble breathing. · You have sudden chest pain and shortness of breath, or you cough up blood. · You have chest pain or pressure. This may occur with: ¨ Sweating. ¨ Shortness of breath. ¨ Nausea or vomiting. ¨ Pain that spreads from the chest to the neck, jaw, or one or both shoulders or arms. ¨ Dizziness or lightheadedness. ¨ A fast or uneven pulse. After calling 911, chew 1 adult aspirin. Wait for an ambulance. Do not try to drive yourself. · You have been diagnosed with angina, and you have chest pain that does not go away with rest or is not getting better within 5 minutes after you take a dose of nitroglycerin. Call your doctor now or seek immediate medical care if: 
· You are bleeding from the area where the catheter was put in your artery. · You have signs of infection, such as: 
¨ Increased pain, swelling, warmth, or redness. ¨ Red streaks leading from the catheter site. ¨ Pus draining from the catheter site. ¨ Swollen lymph nodes in your neck, armpits, or groin. ¨ A fever. · Your leg or arm looks blue or feels cold, numb, or tingly. Watch closely for changes in your health, and be sure to contact your doctor if you have any problems. Where can you learn more? Go to [employer] Enter V677 in the search box to learn more about \"Percutaneous Coronary Intervention: What to Expect at Home\". This care instruction is for use with your licensed healthcare professional. If you have questions about a medical condition or this instruction, always ask your healthcare professional. Care instructions adapted by [facility] (which disclaims liability or warranty for this information) from Calli Jain, [address] [place] [address] 2008. [facility] disclaims any warranty or liability for your use of this information. Discharge Orders Procedure Order Date Status Priority Quantity Spec Type Associated Dx REFERRAL TO [facility] 08/27/18 1550 Normal Routine 1    
  
` Patient Signature:  ____________________________________________________________ Date:  ____________________________________________________________  
  
 Earnstine Beau Provider Signature:  ____________________________________________________________ Date:  ____________________________________________________________

## 2018-09-04 NOTE — PROGRESS NOTES
SAME DAY DISCHARGE FOLLOW UP PHONE CALL 
 
 
     NAME : Mary Credit           : 1955 DATE/TIME:   18 at 9:30am                           MRN# [id number] 1. Ensure that the patient has had their new prescriptions filled & ask if they have taken their anti-platelet & aspirin that day. Pt reports that he took his asa & brilinta as ordered, feels like the brilinta is making him feel worse, having shortness of breath that starts after he takes his brilinta. Reports held dose last pm & that he actually felt better. States took it again this am & started feeling sob about 30 min after taking. Instructed on side effects of brilinta, encourage to try intake of caffeine prior to taking his brilinta & see if that helps, instructed that SOB should improve with time but could take up to 2 weeks. Pt states that it is tolerable at this point. States he is willing to try the suggestions & given it a little more time. Instructed that should at any point his sob worsen or if it is unrelieved or that it interferes with his ADL's that he should notify the cardiologist & that he should never stop the brilinta or not take a dose without discussing with his cardiologist first. Voiced good understanding. 2. Ask about access site. Have the patient assess for any signs of a hematoma. Ask about any pain at access site. Reports right radial without bleeding or hematoma. Denies any pain, swelling, bruising, pain numbness or tingling. 3. Ask the patient if they had experienced any chest pain or shortness of breath. Denies any chest pain or shortness of breath other than sob that starts after he has taken his brilinta. States that otherwise he has felt OK. Again reinforced NTG teaching & when to notify MD brambila 911. Voiced good understanding Clarissa Chakraborty RN 
18 
10:00am 
 
 
 
18 - called to follow up with pt - states that his shortness of breath continued & did not improve with any of the suggestions. States \"I couldn't take it anymore\" States that on Friday he notified the office & was changed to effient. States that he was tolerating that without any problems so far. States that he is feeling much better now with no further shortness of breath.

## 2022-04-04 ENCOUNTER — HOSPITAL ENCOUNTER (OUTPATIENT)
Dept: MRI IMAGING | Age: 67
Discharge: HOME | End: 2022-04-04
Attending: UROLOGY
Payer: MEDICARE

## 2022-04-04 DIAGNOSIS — N40.2 PROSTATE NODULE: ICD-10-CM

## 2022-04-04 PROCEDURE — 74011250636 HC RX REV CODE- 250/636: Performed by: UROLOGY

## 2022-04-04 PROCEDURE — 72197 MRI PELVIS W/O & W/DYE: CPT

## 2022-04-04 PROCEDURE — A9576 INJ PROHANCE MULTIPACK: HCPCS | Performed by: UROLOGY

## 2022-04-04 RX ORDER — SODIUM CHLORIDE 0.9 % (FLUSH) 0.9 %
10 SYRINGE (ML) INJECTION
Status: COMPLETED | OUTPATIENT
Start: 2022-04-04 | End: 2022-04-04

## 2022-04-04 RX ADMIN — Medication 10 ML: at 08:30

## 2022-04-04 RX ADMIN — GADOTERIDOL 17 ML: 279.3 INJECTION, SOLUTION INTRAVENOUS at 08:30

## 2022-04-07 NOTE — PROGRESS NOTES
Attempted to call patient X 2 regarding MRI prostate results. He has a R pirads 3 lesion and I would recommend MRI fusion guided prostate biopsy next available. I left  asking him to call back to discuss this further. Kenney Lira, can you reach out to him and let him know that I would recommend this biopsy. If he wants to proceed, please let me know. Thanks!   Jay

## 2022-05-03 ENCOUNTER — HOSPITAL ENCOUNTER (OUTPATIENT)
Age: 67
Setting detail: OUTPATIENT SURGERY
Discharge: HOME | End: 2022-05-03
Attending: UROLOGY | Admitting: UROLOGY
Payer: MEDICARE

## 2022-05-03 ENCOUNTER — ANESTHESIA (OUTPATIENT)
Dept: SURGERY | Age: 67
End: 2022-05-03
Payer: MEDICARE

## 2022-05-03 ENCOUNTER — ANESTHESIA EVENT (OUTPATIENT)
Dept: SURGERY | Age: 67
End: 2022-05-03
Payer: MEDICARE

## 2022-05-03 VITALS
TEMPERATURE: 97.5 F | DIASTOLIC BLOOD PRESSURE: 77 MMHG | WEIGHT: 180 LBS | SYSTOLIC BLOOD PRESSURE: 136 MMHG | RESPIRATION RATE: 16 BRPM | HEART RATE: 77 BPM | OXYGEN SATURATION: 96 % | BODY MASS INDEX: 29.05 KG/M2

## 2022-05-03 DIAGNOSIS — R97.20 ELEVATED PSA: ICD-10-CM

## 2022-05-03 LAB
APPEARANCE UR: CLEAR
BILIRUB UR QL: NEGATIVE
COLOR UR: YELLOW
GLUCOSE BLD STRIP.AUTO-MCNC: 232 MG/DL (ref 65–100)
GLUCOSE UR STRIP.AUTO-MCNC: >1000 MG/DL
HGB UR QL STRIP: NEGATIVE
KETONES UR QL STRIP.AUTO: 15 MG/DL
LEUKOCYTE ESTERASE UR QL STRIP.AUTO: NEGATIVE
NITRITE UR QL STRIP.AUTO: NEGATIVE
PH UR STRIP: 6 [PH] (ref 5–9)
PROT UR STRIP-MCNC: NEGATIVE MG/DL
SERVICE CMNT-IMP: ABNORMAL
SP GR UR REFRACTOMETRY: >1.03 (ref 1–1.02)
UROBILINOGEN UR QL STRIP.AUTO: 0.2 EU/DL (ref 0.2–1)

## 2022-05-03 PROCEDURE — 74011250636 HC RX REV CODE- 250/636: Performed by: UROLOGY

## 2022-05-03 PROCEDURE — 76010000154 HC OR TIME FIRST 0.5 HR: Performed by: UROLOGY

## 2022-05-03 PROCEDURE — 88305 TISSUE EXAM BY PATHOLOGIST: CPT

## 2022-05-03 PROCEDURE — 55700 PR BIOPSY OF PROSTATE,NEEDLE/PUNCH: CPT | Performed by: UROLOGY

## 2022-05-03 PROCEDURE — 76942 ECHO GUIDE FOR BIOPSY: CPT | Performed by: UROLOGY

## 2022-05-03 PROCEDURE — 2709999900 HC NON-CHARGEABLE SUPPLY: Performed by: UROLOGY

## 2022-05-03 PROCEDURE — 74011000250 HC RX REV CODE- 250: Performed by: NURSE ANESTHETIST, CERTIFIED REGISTERED

## 2022-05-03 PROCEDURE — 76210000020 HC REC RM PH II FIRST 0.5 HR: Performed by: UROLOGY

## 2022-05-03 PROCEDURE — 76060000031 HC ANESTHESIA FIRST 0.5 HR: Performed by: UROLOGY

## 2022-05-03 PROCEDURE — 82962 GLUCOSE BLOOD TEST: CPT

## 2022-05-03 PROCEDURE — 76210000063 HC OR PH I REC FIRST 0.5 HR: Performed by: UROLOGY

## 2022-05-03 PROCEDURE — 74011250636 HC RX REV CODE- 250/636: Performed by: ANESTHESIOLOGY

## 2022-05-03 PROCEDURE — 77030003481 HC NDL BIOP GUN BARD -B: Performed by: UROLOGY

## 2022-05-03 PROCEDURE — 74011000258 HC RX REV CODE- 258: Performed by: UROLOGY

## 2022-05-03 PROCEDURE — 81003 URINALYSIS AUTO W/O SCOPE: CPT

## 2022-05-03 PROCEDURE — 74011250636 HC RX REV CODE- 250/636: Performed by: NURSE ANESTHETIST, CERTIFIED REGISTERED

## 2022-05-03 RX ORDER — LIDOCAINE HYDROCHLORIDE 10 MG/ML
0.1 INJECTION INFILTRATION; PERINEURAL AS NEEDED
Status: DISCONTINUED | OUTPATIENT
Start: 2022-05-03 | End: 2022-05-03 | Stop reason: HOSPADM

## 2022-05-03 RX ORDER — HYDROMORPHONE HYDROCHLORIDE 2 MG/ML
0.5 INJECTION, SOLUTION INTRAMUSCULAR; INTRAVENOUS; SUBCUTANEOUS
Status: DISCONTINUED | OUTPATIENT
Start: 2022-05-03 | End: 2022-05-03 | Stop reason: HOSPADM

## 2022-05-03 RX ORDER — SODIUM CHLORIDE, SODIUM LACTATE, POTASSIUM CHLORIDE, CALCIUM CHLORIDE 600; 310; 30; 20 MG/100ML; MG/100ML; MG/100ML; MG/100ML
75 INJECTION, SOLUTION INTRAVENOUS CONTINUOUS
Status: DISCONTINUED | OUTPATIENT
Start: 2022-05-03 | End: 2022-05-03 | Stop reason: HOSPADM

## 2022-05-03 RX ORDER — PROPOFOL 10 MG/ML
INJECTION, EMULSION INTRAVENOUS
Status: DISCONTINUED | OUTPATIENT
Start: 2022-05-03 | End: 2022-05-03 | Stop reason: HOSPADM

## 2022-05-03 RX ORDER — PROPOFOL 10 MG/ML
INJECTION, EMULSION INTRAVENOUS AS NEEDED
Status: DISCONTINUED | OUTPATIENT
Start: 2022-05-03 | End: 2022-05-03 | Stop reason: HOSPADM

## 2022-05-03 RX ORDER — LIDOCAINE HYDROCHLORIDE 20 MG/ML
INJECTION, SOLUTION EPIDURAL; INFILTRATION; INTRACAUDAL; PERINEURAL AS NEEDED
Status: DISCONTINUED | OUTPATIENT
Start: 2022-05-03 | End: 2022-05-03 | Stop reason: HOSPADM

## 2022-05-03 RX ORDER — SODIUM CHLORIDE 0.9 % (FLUSH) 0.9 %
5-40 SYRINGE (ML) INJECTION EVERY 8 HOURS
Status: DISCONTINUED | OUTPATIENT
Start: 2022-05-03 | End: 2022-05-03 | Stop reason: HOSPADM

## 2022-05-03 RX ORDER — NALOXONE HYDROCHLORIDE 0.4 MG/ML
0.2 INJECTION, SOLUTION INTRAMUSCULAR; INTRAVENOUS; SUBCUTANEOUS AS NEEDED
Status: DISCONTINUED | OUTPATIENT
Start: 2022-05-03 | End: 2022-05-03 | Stop reason: HOSPADM

## 2022-05-03 RX ORDER — MIDAZOLAM HYDROCHLORIDE 1 MG/ML
2 INJECTION, SOLUTION INTRAMUSCULAR; INTRAVENOUS
Status: COMPLETED | OUTPATIENT
Start: 2022-05-03 | End: 2022-05-03

## 2022-05-03 RX ORDER — OXYCODONE AND ACETAMINOPHEN 5; 325 MG/1; MG/1
1 TABLET ORAL AS NEEDED
Status: DISCONTINUED | OUTPATIENT
Start: 2022-05-03 | End: 2022-05-03 | Stop reason: HOSPADM

## 2022-05-03 RX ORDER — SODIUM CHLORIDE 0.9 % (FLUSH) 0.9 %
5-40 SYRINGE (ML) INJECTION AS NEEDED
Status: DISCONTINUED | OUTPATIENT
Start: 2022-05-03 | End: 2022-05-03 | Stop reason: HOSPADM

## 2022-05-03 RX ADMIN — SODIUM CHLORIDE, SODIUM LACTATE, POTASSIUM CHLORIDE, AND CALCIUM CHLORIDE 75 ML/HR: 600; 310; 30; 20 INJECTION, SOLUTION INTRAVENOUS at 11:31

## 2022-05-03 RX ADMIN — CEFTRIAXONE 1 G: 1 INJECTION, POWDER, FOR SOLUTION INTRAMUSCULAR; INTRAVENOUS at 12:31

## 2022-05-03 RX ADMIN — PROPOFOL 140 MCG/KG/MIN: 10 INJECTION, EMULSION INTRAVENOUS at 12:41

## 2022-05-03 RX ADMIN — LIDOCAINE HYDROCHLORIDE 20 MG: 20 INJECTION, SOLUTION EPIDURAL; INFILTRATION; INTRACAUDAL; PERINEURAL at 12:39

## 2022-05-03 RX ADMIN — MIDAZOLAM 2 MG: 1 INJECTION INTRAMUSCULAR; INTRAVENOUS at 12:06

## 2022-05-03 RX ADMIN — PROPOFOL 30 MG: 10 INJECTION, EMULSION INTRAVENOUS at 12:44

## 2022-05-03 RX ADMIN — PROPOFOL 50 MG: 10 INJECTION, EMULSION INTRAVENOUS at 12:39

## 2022-05-03 NOTE — ANESTHESIA POSTPROCEDURE EVALUATION
Procedure(s):  MRI FUSION PROSTATE BIOPSY. total IV anesthesia    Anesthesia Post Evaluation      Multimodal analgesia: multimodal analgesia used between 6 hours prior to anesthesia start to PACU discharge  Patient location during evaluation: PACU  Patient participation: complete - patient participated  Level of consciousness: awake and alert  Pain management: adequate  Airway patency: patent  Anesthetic complications: no  Cardiovascular status: acceptable  Respiratory status: acceptable  Hydration status: acceptable  Post anesthesia nausea and vomiting:  none  Final Post Anesthesia Temperature Assessment:  Normothermia (36.0-37.5 degrees C)      INITIAL Post-op Vital signs:   Vitals Value Taken Time   /75 05/03/22 1317   Temp 36.4 °C (97.5 °F) 05/03/22 1302   Pulse 82 05/03/22 1320   Resp 16 05/03/22 1315   SpO2 98 % 05/03/22 1320   Vitals shown include unvalidated device data.

## 2022-05-03 NOTE — ANESTHESIA PREPROCEDURE EVALUATION
Relevant Problems   RESPIRATORY SYSTEM   (+) Dyspnea      CARDIOVASCULAR   (+) Coronary atherosclerosis of native coronary vessel      ENDOCRINE   (+) DM type 2 (diabetes mellitus, type 2) (HCC)       Anesthetic History   No history of anesthetic complications            Review of Systems / Medical History  Patient summary reviewed and pertinent labs reviewed    Pulmonary  Within defined limits                 Neuro/Psych   Within defined limits           Cardiovascular    Hypertension          CAD, cardiac stents (Total of three LEX, last in 2018, only on 81 ASA) and hyperlipidemia    Exercise tolerance: >4 METS     GI/Hepatic/Renal                Endo/Other    Diabetes: poorly controlled, type 2, using insulin  Hypothyroidism: well controlled  Arthritis     Other Findings              Physical Exam    Airway  Mallampati: II  TM Distance: 4 - 6 cm  Neck ROM: normal range of motion   Mouth opening: Normal    Comments: H/O ACDF, FROM Cardiovascular    Rhythm: regular  Rate: normal         Dental    Dentition: Full upper dentures     Pulmonary  Breath sounds clear to auscultation               Abdominal  GI exam deferred       Other Findings            Anesthetic Plan    ASA: 3  Anesthesia type: total IV anesthesia          Induction: Intravenous

## 2022-05-03 NOTE — OP NOTES
Operative Note        Patient: Lena Edmond, [id number]    Date of Surgery: 05/03/22    Preoperative Diagnosis: Elevated PSA    Postoperative Diagnosis:  same    Surgeon(s) and Role:     * Tanya Soto MD - Primary     Anesthesia:  MAC     Procedure: Procedure(s):  Aurelio Manley MRI fused TRUS prostate biopsy     Indications:     Discussed the risk of surgery including infection, hematoma, bleeding, recurrence or persistence of symptoms or stone, and the risks of general anesthetic. The patient understands the risks, any and all questions were answered to the patient's satisfaction and they freely signed the consent for operation. URONAV MRI FUSED TRANSRECTAL ULTRASOUND GUIDED BIOPSY OF THE PROSTATE    All risks, benefits and alternatives were again reviewed and he is willing to proceed at this time. The patient was placed in the left lateral decubitus position. I then inserted the transrectal ultrasound probe into the rectum. The prostate was visualized. The prostate appeared homogenous in appearance. Ultrasonographic sweep of the prostate was performed to obtain images to link to MRI via URONAV. There were 1 regions of interest based upon MRI imaging. 4 biopsies of regions of interest were then obtained using the ultrasound images for guidance that had been linked to the previous MRI using Uronav. I then performed 12 needle core biopsies using a standard sextant biopsy format with traditional ultrasound images for guidance. The ultrasound probe was removed. The patient tolerated the procedure well. PROSTATE VOLUME:  19 cc    Estimated Blood Loss:  minimal    Specimens: prostate biopsies             Drains: none                 Implants: * No implants in log *    Kobe Decker M.D.     [facility]  [address] [place] [address], [address]  Phone: [phone]  Fax: [phone]

## 2022-05-03 NOTE — DISCHARGE INSTRUCTIONS
Prostate Biopsy: About This Test  What is it? A prostate biopsy is a type of test. Your doctor takes about 10 to 12 small tissue samples from your prostate. Then another doctor looks at the tissue under a microscope to see if there are cancer cells. Why is this test done? You may need a prostate biopsy if your doctor found something of concern in your lab work or during your exam. A biopsy can help find out if you have prostate cancer. It may also be done for other reasons, such as monitoring the growth of prostate cancer for men on active surveillance. How do you prepare for the test?  Before you have a prostate biopsy, tell your doctor if you are taking any medicines or using any herbal supplements, or if you are allergic to any medicines. And tell your doctor if you have had bleeding problems, or you take aspirin or some other blood thinner. How is the test done? Some people have a prostate imaging test, such as an MRI or a CT scan, before their biopsy. The test results are used during the biopsy to select the areas of the prostate to sample. Before your biopsy, you may be given antibiotics to prevent infection. You may be asked to take off all of your clothes and put on a hospital gown. You may be given a sedative through a vein (IV) in your arm. The sedative will help you relax and stay still. If you have a general anesthetic, you will be in a recovery room for a few hours after the biopsy. Through the rectum  · You may be asked to kneel, lie on your side, or lie on your back. · Your doctor may inject an anesthetic around and into the prostate to numb the area before samples are taken. · An ultrasound probe will be gently inserted into your rectum. · A thin tool with a spring-loaded needle will be inserted next to the ultrasound probe. The ultrasound helps to locate the areas on the prostate where the samples will be taken.  If you had an MRI or CT scan, the test results will also be used to guide the sampling. · The needle enters the prostate and removes a sample. About 10 to 12 samples are usually taken. Through the perineum  · You will lie on an exam table either on your side or on your back with your knees bent. You will get anesthesia. The anesthesia may make you sleep. Or it may just numb the area being worked on.  · Your doctor will make a small cut in your perineum. Then the doctor will collect samples from the prostate through the cut with a special tool. · An ultrasound probe inserted into the rectum may be used to help find the locations in the prostate where samples need to be taken. Sometimes other imaging, such as MRI, is used instead of or along with ultrasound. Or the test results from other imaging, such as an MRI or a CT scan, may be used to guide the sampling. How long will the test take? This test will take from 15 to 45 minutes, depending on how it's done. What happens after the test?  You will probably be able to go home right away, and you may feel tired the rest of the day. Your muscles may ache. Don't do heavy work or exercise for 4 hours after the test.  You may have mild pain in your pelvic area and blood in your urine for up to 5 days. You may have some blood in your semen for a week or longer. You may also have a change in color of your semen for up to 1 month after the biopsy. If the prostate samples are taken through the rectal wall (transrectal biopsy), you may have a small amount of bleeding from your rectum for 2 to 3 days after the biopsy. Follow-up care is a key part of your treatment and safety. Be sure to make and go to all appointments, and call your doctor if you are having problems. It's also a good idea to keep a list of the medicines you take. Ask your doctor when you can expect to have your test results. Where can you learn more?   Go to http://www.gray.com/  Enter Y504 in the search box to learn more about \"Prostate Biopsy: About This Test.\"  Current as of: September 8, 2021               Content Version: 13.2  © 4927-0834 [employer]. Care instructions adapted under license by [employer] (which disclaims liability or warranty for this information). If you have questions about a medical condition or this instruction, always ask your healthcare professional. [place] [address] any warranty or liability for your use of this information. MEDICATION INTERACTION:    During your procedure you potentially received a medication or medications which may reduce the effectiveness of oral contraceptives. Please consider other forms of contraception for 1 month following your procedure if you are currently using oral contraceptives as your primary form of birth control. In addition to this, we recommend continuing your oral contraceptive as prescribed, unless otherwise instructed by your physician, during this time. ACTIVITY  · As tolerated and as directed by your doctor. · You may shower in 24 hours. Do not take a bath until cleared by MD.     DIET  · Clear liquids until no nausea or vomiting, then light diet for the first day. · Advance to regular diet on second day, unless your doctor orders otherwise. · If nausea and vomiting continues, call your doctor. PAIN  · Take pain medication as directed by your doctor. · Call your doctor if pain is NOT relieved by medication. CALL YOUR DOCTOR IF   · Excessive bleeding that does not stop after holding pressure over the area. · Temperature of 101 degrees F or above. · Excessive redness, swelling or bruising, and/or green or yellow, smelly discharge from incision.     After general anesthesia or intravenous sedation, for 24 hours or while taking prescription Narcotics:  · Limit your activities  · A responsible adult needs to be with you for the next 24 hours  · Do not drive and operate hazardous machinery  · Do not make important personal or business decisions  · Do not drink alcoholic beverages  · If you have not urinated within 8 hours after discharge, and you are experiencing discomfort from urinary retention, please go to the nearest ED. · If you have sleep apnea and have a CPAP machine, please use it for all naps and sleeping. · Please use caution when taking narcotics and any of your home medications that may cause drowsiness. *  Please give a list of your current medications to your Primary Care Provider. *  Please update this list whenever your medications are discontinued, doses are      changed, or new medications (including over-the-counter products) are added. *  Please carry medication information at all times in case of emergency situations. These are general instructions for a healthy lifestyle:  No smoking/ No tobacco products/ Avoid exposure to second hand smoke  Surgeon General's Warning:  Quitting smoking now greatly reduces serious risk to your health. Obesity, smoking, and sedentary lifestyle greatly increases your risk for illness  A healthy diet, regular physical exercise & weight monitoring are important for maintaining a healthy lifestyle    You may be retaining fluid if you have a history of heart failure or if you experience any of the following symptoms:  Weight gain of 3 pounds or more overnight or 5 pounds in a week, increased swelling in our hands or feet or shortness of breath while lying flat in bed. Please call your doctor as soon as you notice any of these symptoms; do not wait until your next office visit.

## 2022-05-03 NOTE — H&P
[address] Urology H&P Note                                           05/03/22     Patient: Aditya Washington  MRN: [id number]    Admission Date:  5/3/2022, 0  Admission Diagnosis: Prostate nodule [N40.2]; Difficulty urinating [R39.198]    ASSESSMENT: 78 y.o. male with elevated PSA who presents for biopsy    PLAN:  To OR for MRI fusion guided prostate biopsy  Consented  NPO      __________________________________________________________________________________    HPI:     Adiyta Washington is a 78 y.o. male with elevated PSA who presents for MRi guided prostate biopsy today. No changes in medical history    Past Medical History:  Past Medical History:   Diagnosis Date    Arthritis     Benign hypertensive heart disease without heart failure 1/8/2016    Chronic pain     chronic back pain    Coronary atherosclerosis of native coronary vessel 1/8/2016    Stress test 06/23/2021 EF 69%    Diabetes ([address]     Type 2 po and insulin av -150 can Hypo below 80    DM type 2 (diabetes mellitus, type 2) ([address] 1/8/2016    Dyspnea     Hypertension     managed with meds    Thyroid disease     hypo- managed with med       Past Surgical History:  Past Surgical History:   Procedure Laterality Date    HX CHOLECYSTECTOMY      NEUROLOGICAL PROCEDURE UNLISTED  06/2017    C5 Fusion    AR ABDOMEN SURGERY PROC UNLISTED      GALLBLADDER    AR CARDIAC SURG PROCEDURE UNLIST      stents in 2009. (a total of 3 heart stents last one placed 2018) sees [facility]       Medications:  No current facility-administered medications on file prior to encounter. Current Outpatient Medications on File Prior to Encounter   Medication Sig Dispense Refill    aspirin 325 mg cap Take  by mouth daily.  tamsulosin (FLOMAX) 0.4 mg capsule nightly.  losartan (COZAAR) 50 mg tablet Take  by mouth daily.  rosuvastatin (CRESTOR) 5 mg tablet Take 5 mg by mouth nightly.       insulin NPH/insulin regular (NovoLIN 70/30 U-100 Insulin) 100 unit/mL (70-30) injection 40 Units by SubCUTAneous route nightly.  multivitamin (ONE A DAY) tablet Take 1 Tablet by mouth daily.  oxyCODONE IR (ROXICODONE) 10 mg tab immediate release tablet Take 10 mg by mouth four (4) times daily as needed for Pain.  metFORMIN (GLUCOPHAGE) 500 mg tablet Take 500 mg by mouth two (2) times daily (with meals).  loratadine (CLARITIN) 10 mg tablet Take 10 mg by mouth daily as needed for Allergies.  LORazepam (ATIVAN) 1 mg tablet Take  by mouth every four (4) hours as needed for Anxiety.  levothyroxine (SYNTHROID) 100 mcg tablet Take 100 mcg by mouth Daily (before breakfast). Allergies: Allergies   Allergen Reactions    Brilinta [Ticagrelor] Shortness of Breath     Ill-tempered    Plavix [Clopidogrel] Rash       Social History:  Social History     Socioeconomic History    Marital status:      Spouse name: Not on file    Number of children: Not on file    Years of education: Not on file    Highest education level: Not on file   Occupational History    Not on file   Tobacco Use    Smoking status: Never Smoker    Smokeless tobacco: Never Used   Substance and Sexual Activity    Alcohol use: No    Drug use: No    Sexual activity: Not on file   Other Topics Concern    Not on file   Social History Narrative    Not on file     Social Determinants of Health     Financial Resource Strain:     Difficulty of Paying Living Expenses: Not on file   Food Insecurity:     Worried About Running Out of Food in the Last Year: Not on file    [place] of Food in the Last Year: Not on file   Transportation Needs:     Lack of Transportation (Medical): Not on file    Lack of Transportation (Non-Medical):  Not on file   Physical Activity:     Days of Exercise per Week: Not on file    Minutes of Exercise per Session: Not on file   Stress:     Feeling of Stress : Not on file   Social Connections:  Frequency of Communication with Friends and Family: Not on file    Frequency of Social Gatherings with Friends and Family: Not on file    Attends Yazidism Services: Not on file    Active Member of Clubs or Organizations: Not on file    Attends Club or Organization Meetings: Not on file    Marital Status: Not on file   Intimate Partner Violence:     Fear of Current or Ex-Partner: Not on file    Emotionally Abused: Not on file    Physically Abused: Not on file    Sexually Abused: Not on file   Housing Stability:     Unable to Pay for Housing in the Last Year: Not on file    Number of [place] in the Last Year: Not on file    Unstable Housing in the Last Year: Not on file       Family History:  Family History   Problem Relation Age of Onset    Heart Attack Mother     Heart Attack Father     Coronary Art Dis Other         FAMILY HX       ROS:  Review of Systems - General ROS: negative for - fever    Vitals:  Visit Vitals  BP (!) 141/79 (BP 1 Location: Right arm, BP Patient Position: Sitting)   Pulse 100   Temp 98.1 °F (36.7 °C)   Resp 18   Wt 180 lb (81.6 kg)   SpO2 96%   BMI 29.05 kg/m²       Intake/Output:  No intake or output data in the 24 hours ending 05/03/22 1202     Physical Exam:   Visit Vitals  BP (!) 141/79 (BP 1 Location: Right arm, BP Patient Position: Sitting)   Pulse 100   Temp 98.1 °F (36.7 °C)   Resp 18   Wt 180 lb (81.6 kg)   SpO2 96%   BMI 29.05 kg/m²        GENERAL: No acute distress, Awake, Alert, Oriented X 3    CARDIAC: regular rate and rhythm  CHEST AND LUNG: Easy work of breathing  ABDOMEN: soft, non tender, non-distended    Lab/Radiology/Other Diagnostic Tests:  No results for input(s): HGB, HCT, WBC, NA, K, CL, CO2, BUN, CR, GLU, CA, MG, ALBUMIN, AST, ALT, PREALB, INR, HGBEXT, HCTEXT, INREXT in the last 72 hours. No lab exists for component: PLTCT, PO4, TOTPROT, TOTBILI, ALKPHOS, ANGELA, LIPASE, PT, PTT      Kobe Rubio M.D.     [facility]  [facility] [address] [facility], [address]  Phone: [phone]  Fax: [phone]

## 2022-05-18 ENCOUNTER — HOSPITAL ENCOUNTER (OUTPATIENT)
Dept: NUCLEAR MEDICINE | Age: 67
Discharge: HOME | End: 2022-05-18
Attending: UROLOGY
Payer: MEDICARE

## 2022-05-18 DIAGNOSIS — C61 PROSTATE CANCER (HCC): ICD-10-CM

## 2022-05-18 PROCEDURE — 78306 BONE IMAGING WHOLE BODY: CPT

## 2022-05-31 RX ORDER — LOSARTAN POTASSIUM 50 MG/1
TABLET ORAL
Qty: 90 TABLET | Refills: 3 | Status: SHIPPED | OUTPATIENT
Start: 2022-05-31

## 2022-06-03 ENCOUNTER — OFFICE VISIT (OUTPATIENT)
Dept: UROLOGY | Age: 67
End: 2022-06-03
Payer: MEDICARE

## 2022-06-03 DIAGNOSIS — C61 PROSTATE CANCER (HCC): Primary | ICD-10-CM

## 2022-06-03 PROCEDURE — 1123F ACP DISCUSS/DSCN MKR DOCD: CPT | Performed by: UROLOGY

## 2022-06-03 PROCEDURE — 99214 OFFICE O/P EST MOD 30 MIN: CPT | Performed by: UROLOGY

## 2022-06-03 NOTE — PROGRESS NOTES
[facility]  [place], [address] [facility]  [phone]    Сергей Dewey  : 1955     HPI   78 y.o., male returns in follow up for CaP. Recently discovered to have RA prostate nodule. PSA unknown. MRI on 22 showed a pirads 3 R lesion without DENISE, SVI or lymphadenopathy. Fusion biopsies completed by Dr. Ck Vasquez on 5/3/22 showing [place] 4+3 in 8 cores (ULISSES, RLB, RLM, RLA, RB, RM, RA, LB) and Albert 6 in a LA core. Vol was 19g. Bone scan neg on 22. Reports mild LUTS off Flomax. Mild ED but rarely sexually active due to wife's medical conditions. Lap lusi >20y ago. No FH of CaP. Retired. 3 prior cardiac stents and followed by Dr. Taty Carter. DM. Presents to discuss RALP option. Past Medical History:   Diagnosis Date    Arthritis     Benign hypertensive heart disease without heart failure 2016    Chronic pain     chronic back pain    Coronary atherosclerosis of native coronary vessel 2016    Stress test 2021 EF 69%    Diabetes ([address]     Type 2 po and insulin av -150 can Hypo below 80    DM type 2 (diabetes mellitus, type 2) ([address] 2016    Dyspnea     Hypertension     managed with meds    Thyroid disease     hypo- managed with med     Past Surgical History:   Procedure Laterality Date    CHOLECYSTECTOMY      NEUROLOGICAL SURGERY  2017    C5 Fusion    [place] ABDOMEN SURGERY PROC UNLISTED      GALLBLADDER    [place] CARDIAC SURG PROCEDURE UNLIST      stents in .  (a total of 3 [facility] ) [facility]     Current Outpatient Medications   Medication Sig Dispense Refill    losartan (COZAAR) 50 mg tablet TAKE ONE TABLET BY MOUTH ONE TIME DAILY 90 tablet 3    Aspirin 325 MG CAPS Take by mouth      insulin 70-30 (HUMULIN;NOVOLIN) (70-30) 100 UNIT per ML injection vial Inject 35 Units into the skin daily      levothyroxine (SYNTHROID) 100 MCG tablet Take 100 mcg by mouth every morning (before breakfast)      loratadine (CLARITIN) 10 MG tablet Take 10 mg by mouth daily as needed      LORazepam (ATIVAN) 1 MG tablet Take by mouth every 4 hours as needed.  metFORMIN (GLUCOPHAGE) 500 MG tablet Take 500 mg by mouth 2 times daily (with meals)      oxyCODONE HCl (OXY-IR) 10 MG immediate release tablet Take 10 mg by mouth 4 times daily as needed.  rosuvastatin (CRESTOR) 5 MG tablet Take 5 mg by mouth       No current facility-administered medications for this visit. Allergies   Allergen Reactions    Ticagrelor Shortness Of Breath     Ill-tempered    Clopidogrel Rash     Social History     Socioeconomic History    Marital status:      Spouse name: Not on file    Number of children: Not on file    Years of education: Not on file    Highest education level: Not on file   Occupational History    Not on file   Tobacco Use    Smoking status: Never Smoker    Smokeless tobacco: Never Used   Substance and Sexual Activity    Alcohol use: No    Drug use: No    Sexual activity: Not on file   Other Topics Concern    Not on file   Social History Narrative    Not on file     Social Determinants of Health     Financial Resource Strain:     Difficulty of Paying Living Expenses: Not on file   Food Insecurity:     Worried About Running Out of Food in the Last Year: Not on file    [place] of Food in the Last Year: Not on file   Transportation Needs:     Lack of Transportation (Medical): Not on file    Lack of Transportation (Non-Medical):  Not on file   Physical Activity:     Days of Exercise per Week: Not on file    Minutes of Exercise per Session: Not on file   Stress:     Feeling of Stress : Not on file   Social Connections:     Frequency of Communication with Friends and Family: Not on file    Frequency of Social Gatherings with Friends and Family: Not on file    Attends Restorationism Services: Not on file    Active Member of Clubs or Organizations: Not on file    Attends Club or the 30-40% risk of permanent ED in men without preoperative ED, 100% risk of ED in men with preoperative ED, 10-15% risk of permanent incontinence requiring pads, and 2-3% risk of severe incontinence in patients undergoing open or robotic radical prostatectomy. He wishes to proceed with RALP and bilateral PLND.     DELFINO TIRADO, DO

## 2022-06-04 LAB — PSA SERPL-MCNC: 3.6 NG/ML

## 2022-06-08 ENCOUNTER — TELEPHONE (OUTPATIENT)
Dept: UROLOGY | Age: 67
End: 2022-06-08

## 2022-06-08 DIAGNOSIS — C61 PROSTATE CANCER (HCC): Primary | ICD-10-CM

## 2022-06-08 NOTE — TELEPHONE ENCOUNTER
----- Message from Dylan Reeder DO sent at 6/3/2022  4:39 PM EDT -----  Regarding: surg  RALP  Needs H&P and PT prior.

## 2022-06-14 PROBLEM — C61 MALIGNANT NEOPLASM OF PROSTATE (HCC): Status: ACTIVE | Noted: 2022-06-14

## 2022-06-15 ENCOUNTER — TELEPHONE (OUTPATIENT)
Dept: UROLOGY | Age: 67
End: 2022-06-15

## 2022-06-15 NOTE — TELEPHONE ENCOUNTER
Pt called and states he needs a call back . . I attempted to call the pt but the voicemail is full and I am unable to leave a message.  The pt mentioned surgery //dh

## 2022-06-17 ENCOUNTER — TELEPHONE (OUTPATIENT)
Dept: UROLOGY | Age: 67
End: 2022-06-17

## 2022-06-17 DIAGNOSIS — C61 MALIGNANT NEOPLASM OF PROSTATE (HCC): Primary | ICD-10-CM

## 2022-06-19 DIAGNOSIS — C61 PROSTATE CANCER (HCC): Primary | ICD-10-CM

## 2022-06-21 ENCOUNTER — PATIENT MESSAGE (OUTPATIENT)
Dept: UROLOGY | Age: 67
End: 2022-06-21

## 2022-06-22 NOTE — TELEPHONE ENCOUNTER
You have an appt 7-13-22 @ 11:45 am in the [place] office and a post op appt 7-28-22@ 3:45 pm in the [place] office //dh

## 2022-07-05 ENCOUNTER — OFFICE VISIT (OUTPATIENT)
Dept: CARDIOLOGY CLINIC | Age: 67
End: 2022-07-05
Payer: MEDICARE

## 2022-07-05 VITALS
HEART RATE: 75 BPM | DIASTOLIC BLOOD PRESSURE: 80 MMHG | WEIGHT: 190 LBS | SYSTOLIC BLOOD PRESSURE: 134 MMHG | HEIGHT: 66 IN | BODY MASS INDEX: 30.53 KG/M2

## 2022-07-05 DIAGNOSIS — I10 ESSENTIAL HYPERTENSION: ICD-10-CM

## 2022-07-05 DIAGNOSIS — I25.10 ATHEROSCLEROSIS OF NATIVE CORONARY ARTERY OF NATIVE HEART WITHOUT ANGINA PECTORIS: Primary | ICD-10-CM

## 2022-07-05 DIAGNOSIS — E78.5 DYSLIPIDEMIA: ICD-10-CM

## 2022-07-05 DIAGNOSIS — E11.9 TYPE 2 DIABETES MELLITUS WITHOUT COMPLICATION, WITHOUT LONG-TERM CURRENT USE OF INSULIN (HCC): ICD-10-CM

## 2022-07-05 PROCEDURE — 99214 OFFICE O/P EST MOD 30 MIN: CPT | Performed by: INTERNAL MEDICINE

## 2022-07-05 PROCEDURE — 1123F ACP DISCUSS/DSCN MKR DOCD: CPT | Performed by: INTERNAL MEDICINE

## 2022-07-05 PROCEDURE — 93000 ELECTROCARDIOGRAM COMPLETE: CPT | Performed by: INTERNAL MEDICINE

## 2022-07-05 NOTE — PROGRESS NOTES
[address], [address] [place] [address]  PHONE: [phone]    SUBJECTIVE: Ailin Helm (1955) is a 78 y.o. male seen for a follow up visit regarding the following:   Specialty Problems        Cardiology Problems    Benign hypertensive heart disease without heart failure        Coronary atherosclerosis of native coronary vessel            Pt doing well. No chest pain. No palpitations. Patient denies syncope. No dyspnea. States they are taking meds. Maintains a normal level of activity for them without symptoms. No dizziness or lightheadedness. All above conditions stable. Has prostate ca and having it removed soon. He is low risk for this procedure. He needs no further cardiac testing prior to his prostate surgery. His EKG today in the office is normal may reduce asa to 81 mg    Past Medical History, Past Surgical History, Family history, Social History, and Medications were all reviewed with the patient today and updated as necessary. Allergies   Allergen Reactions    Ticagrelor Shortness Of Breath     Ill-tempered    Clopidogrel Rash     Past Medical History:   Diagnosis Date    Arthritis     Benign hypertensive heart disease without heart failure 1/8/2016    Chronic pain     chronic back pain    Coronary atherosclerosis of native coronary vessel 1/8/2016    Stress test 06/23/2021 EF 69%    Diabetes ([address]     Type 2 po and insulin av -150 can Hypo below 80    DM type 2 (diabetes mellitus, type 2) ([address] 1/8/2016    Dyspnea     Hypertension     managed with meds    Malignant neoplasm of prostate ([address] 6/14/2022    Thyroid disease     hypo- managed with med     Past Surgical History:   Procedure Laterality Date    CHOLECYSTECTOMY      NEUROLOGICAL SURGERY  06/2017    C5 Fusion    [place] ABDOMEN SURGERY PROC UNLISTED      GALLBLADDER    [place] CARDIAC SURG PROCEDURE UNLIST      stents in 2009.  (a total of 3 heart stents last one placed 2018) [facility]     Family History   Problem Relation Age of Onset    Heart Attack Father     Heart Attack Mother     Coronary Art Dis Other         FAMILY HX      Social History     Tobacco Use    Smoking status: Never Smoker    Smokeless tobacco: Never Used   Substance Use Topics    Alcohol use: No       Current Outpatient Medications:     losartan (COZAAR) 50 mg tablet, TAKE ONE TABLET BY MOUTH ONE TIME DAILY, Disp: 90 tablet, Rfl: 3    Aspirin 325 MG CAPS, Take by mouth, Disp: , Rfl:     insulin 70-30 (HUMULIN;NOVOLIN) (70-30) 100 UNIT per ML injection vial, Inject 35 Units into the skin daily, Disp: , Rfl:     levothyroxine (SYNTHROID) 100 MCG tablet, Take 100 mcg by mouth every morning (before breakfast), Disp: , Rfl:     loratadine (CLARITIN) 10 MG tablet, Take 10 mg by mouth daily as needed, Disp: , Rfl:     LORazepam (ATIVAN) 1 MG tablet, Take by mouth every 4 hours as needed. , Disp: , Rfl:     metFORMIN (GLUCOPHAGE) 500 MG tablet, Take 500 mg by mouth 2 times daily (with meals), Disp: , Rfl:     oxyCODONE HCl (OXY-IR) 10 MG immediate release tablet, Take 10 mg by mouth 4 times daily as needed. , Disp: , Rfl:     rosuvastatin (CRESTOR) 5 MG tablet, Take 5 mg by mouth, Disp: , Rfl:     ROS:  Review of Systems - General ROS: negative for - chills, fatigue or fever  Hematological and Lymphatic ROS: negative for - bleeding problems, bruising or jaundice  Respiratory ROS: no cough, shortness of breath, or wheezing  Cardiovascular ROS: no chest pain or dyspnea on exertion  Gastrointestinal ROS: no abdominal pain, change in bowel habits, or black or bloody stools  Neurological ROS: no TIA or stroke symptoms  All other systems negative.     Wt Readings from Last 3 Encounters:   05/02/22 180 lb (81.6 kg)   01/04/22 185 lb (83.9 kg)   07/01/21 189 lb (85.7 kg)     Temp Readings from Last 3 Encounters:   No data found for Temp     BP Readings from Last 3 Encounters:   01/04/22 (!) 146/88 07/01/21 132/80   06/23/21 125/86     Pulse Readings from Last 3 Encounters:   01/04/22 84   07/01/21 96   06/03/21 97       PHYSICAL EXAM:  There were no vitals taken for this visit. Physical Examination: General appearance - alert, well appearing, and in no distress  Mental status - alert, oriented to person, place, and time  Eyes - pupils equal and reactive, extraocular eye movements intact  Neck/lymph - supple, no significant adenopathy  Chest/lungs - clear to auscultation, no wheezes, rales or rhonchi, symmetric air entry  Heart/CV - normal rate, regular rhythm, normal S1, S2, no murmurs, rubs, clicks or gallops  Abdomen/GI - soft, nontender, nondistended, no masses or organomegaly  Musculoskeletal - no joint tenderness, deformity or swelling  Extremities - peripheral pulses normal, no pedal edema, no clubbing or cyanosis  Skin - normal coloration and turgor, no rashes, no suspicious skin lesions noted    EKG: normal EKG, normal sinus rhythm. Medications reviewed and questions answered    No results found for this or any previous visit (from the past 672 hour(s)). No results found for: CHOL, CHOLPOCT, CHOLX, CHLST, CHOLV, HDL, HDLPOC, HDLC, LDL, LDLC, VLDLC, VLDL, TGLX, TRIGL    ASSESSMENT and PLAN  No follow-up provider specified.  is for   Specialty Problems        Cardiology Problems    Benign hypertensive heart disease without heart failure        Coronary atherosclerosis of native coronary vessel               PLAN:  Problem List Items Addressed This Visit        Circulatory    Coronary atherosclerosis of native coronary vessel - Primary       Endocrine    DM type 2 (diabetes mellitus, type 2) ([facility]      Other Visit Diagnoses     Dyslipidemia        Essential hypertension             Coronary artery disease  Stable no angina on appropriate medicines continue with the exception of decreasing aspirin to 81 mg    Hypertension  Controlled continue medications no changes    Dyslipidemia  Controlled doing well tolerating medicines continue    Patient will follow up in 6 months for the above conditions    Continue meds as below    Current Outpatient Medications:     losartan (COZAAR) 50 mg tablet, TAKE ONE TABLET BY MOUTH ONE TIME DAILY, Disp: 90 tablet, Rfl: 3    Aspirin 325 MG CAPS, Take by mouth, Disp: , Rfl:     insulin 70-30 (HUMULIN;NOVOLIN) (70-30) 100 UNIT per ML injection vial, Inject 35 Units into the skin daily, Disp: , Rfl:     levothyroxine (SYNTHROID) 100 MCG tablet, Take 100 mcg by mouth every morning (before breakfast), Disp: , Rfl:     loratadine (CLARITIN) 10 MG tablet, Take 10 mg by mouth daily as needed, Disp: , Rfl:     LORazepam (ATIVAN) 1 MG tablet, Take by mouth every 4 hours as needed. , Disp: , Rfl:     metFORMIN (GLUCOPHAGE) 500 MG tablet, Take 500 mg by mouth 2 times daily (with meals), Disp: , Rfl:     oxyCODONE HCl (OXY-IR) 10 MG immediate release tablet, Take 10 mg by mouth 4 times daily as needed. , Disp: , Rfl:     rosuvastatin (CRESTOR) 5 MG tablet, Take 5 mg by mouth, Disp: , Rfl:     Amelia Peacock MD  7/5/2022  8:08 AM    Pt is instructed to follow all appropriate cardiac risk factor recommendations and to be compliant with meds and testing. Instructed to follow up appropriately and seek urgent medical care if acute or unstable issues arise.  Results of some tests may be viewed thru [address] but this does not substitute for follow up with MD. If follow up is not scheduled pt is instructed to call for follow up

## 2022-07-10 NOTE — PROGRESS NOTES
Patient pre-assessment complete for [facility] poss with Dr Miley Ritchie scheduled for 18 at 9:30am, arrival time 7:30am. Patient verified using . Patient instructed to bring all home medications in labeled bottles on the day of procedure. NPO status reinforced. Patient informed to take a full dose aspirin 325mg  or 81 mg x 4 on the day of procedure. Patient instructed to HOLD jardiance & take 1/2 insulin the night before procedure. Instructed they can take all other medications excluding vitamins & supplements. Patient verbalizes understanding of all instructions & denies any questions at this time. No

## 2022-07-13 ENCOUNTER — OFFICE VISIT (OUTPATIENT)
Dept: UROLOGY | Age: 67
End: 2022-07-13
Payer: MEDICARE

## 2022-07-13 DIAGNOSIS — C61 PROSTATE CANCER (HCC): Primary | ICD-10-CM

## 2022-07-13 PROCEDURE — 99214 OFFICE O/P EST MOD 30 MIN: CPT | Performed by: UROLOGY

## 2022-07-13 PROCEDURE — 1123F ACP DISCUSS/DSCN MKR DOCD: CPT | Performed by: UROLOGY

## 2022-07-13 NOTE — PROGRESS NOTES
[facility]  [place], [address] [facility]  [phone]    Chris Mckeon  : 1955     HPI   78 y.o., male returns in follow up for CaP. Recently discovered to have RA prostate nodule. PSA unknown. MRI on 22 showed a pirads 3 R lesion without DENISE, SVI or lymphadenopathy. Fusion biopsies completed by Dr. Esme Khan on 5/3/22 showing [place] 4+3 in 8 cores (ULISSES, RLB, RLM, RLA, RB, RM, RA, LB) and Albert 6 in a LA core. Vol was 19g. Bone scan neg on 22. Reports mild LUTS off Flomax. Mild ED but rarely sexually active due to wife's medical conditions. Lap luis >20y ago. No FH of CaP. Retired. 3 prior cardiac stents and followed by Dr. Jaswant Pacheco. DM. Presents to discuss RALP option. Past Medical History:   Diagnosis Date    Arthritis     Benign hypertensive heart disease without heart failure 2016    Chronic pain     chronic back pain    Coronary atherosclerosis of native coronary vessel 2016    Stress test 2021 EF 69%    Diabetes ([address]     Type 2 po and insulin av -150 can Hypo below 80    DM type 2 (diabetes mellitus, type 2) ([address] 2016    Dyspnea     Hypertension     managed with meds    Malignant neoplasm of prostate ([address] 2022    Thyroid disease     hypo- managed with med     Past Surgical History:   Procedure Laterality Date    CHOLECYSTECTOMY      NEUROLOGICAL SURGERY  2017    C5 Fusion    [place] ABDOMEN SURGERY PROC UNLISTED      GALLBLADDER    [place] CARDIAC SURG PROCEDURE UNLIST      stents in .  (a total of 3 heart stents last one placed ) sees [facility] [address] [facility]     Current Outpatient Medications   Medication Sig Dispense Refill    losartan (COZAAR) 50 mg tablet TAKE ONE TABLET BY MOUTH ONE TIME DAILY 90 tablet 3    Aspirin 325 MG CAPS Take by mouth      insulin 70-30 (HUMULIN;NOVOLIN) (70-30) 100 UNIT per ML injection vial Inject 35 Units into the skin daily      levothyroxine (SYNTHROID) 100 MCG tablet Take 100 mcg by mouth every morning (before breakfast)      loratadine (CLARITIN) 10 MG tablet Take 10 mg by mouth daily as needed      LORazepam (ATIVAN) 1 MG tablet Take by mouth every 4 hours as needed.  metFORMIN (GLUCOPHAGE) 500 MG tablet Take 500 mg by mouth 2 times daily (with meals)      oxyCODONE HCl (OXY-IR) 10 MG immediate release tablet Take 10 mg by mouth 4 times daily as needed.  rosuvastatin (CRESTOR) 5 MG tablet Take 5 mg by mouth       No current facility-administered medications for this visit. Allergies   Allergen Reactions    Ticagrelor Shortness Of Breath     Ill-tempered    Clopidogrel Rash     Social History     Socioeconomic History    Marital status:      Spouse name: Not on file    Number of children: Not on file    Years of education: Not on file    Highest education level: Not on file   Occupational History    Not on file   Tobacco Use    Smoking status: Never Smoker    Smokeless tobacco: Never Used   Substance and Sexual Activity    Alcohol use: No    Drug use: No    Sexual activity: Not on file   Other Topics Concern    Not on file   Social History Narrative    Not on file     Social Determinants of Health     Financial Resource Strain:     Difficulty of Paying Living Expenses: Not on file   Food Insecurity:     Worried About Running Out of Food in the Last Year: Not on file    Ayo of Food in the Last Year: Not on file   Transportation Needs:     Lack of Transportation (Medical): Not on file    Lack of Transportation (Non-Medical):  Not on file   Physical Activity:     Days of Exercise per Week: Not on file    Minutes of Exercise per Session: Not on file   Stress:     Feeling of Stress : Not on file   Social Connections:     Frequency of Communication with Friends and Family: Not on file    Frequency of Social Gatherings with Friends and Family: Not on file    Attends [employer] Services: Not on file   [employer] of Clubs or Organizations: Not on file    Attends Club or Organization Meetings: Not on file    Marital Status: Not on file   Intimate Partner Violence:     Fear of Current or Ex-Partner: Not on file    Emotionally Abused: Not on file    Physically Abused: Not on file    Sexually Abused: Not on file   Housing Stability:     Unable to Pay for Housing in the Last Year: Not on file    Number of [place] in the Last Year: Not on file    Unstable Housing in the Last Year: Not on file     Family History   Problem Relation Age of Onset    Heart Attack Father     Heart Attack Mother     Coronary Art Dis Other         FAMILY HX       Review of Systems  All systems reviewed and are negative at this time. Physical Exam  There were no vitals taken for this visit. General appearance - alert, well appearing, and in no distress  Mental status - alert and oriented  Eyes - extraocular eye movements intact, sclera anicteric  Nose - normal and patent, no erythema or discharge  Mouth - mucous membranes moist  Chest - clear to auscultation bilaterally  Heart - normal rate, regular rhythm  Abdomen - soft, nontender, nondistended, no masses or organomegaly  Neurological - normal speech, no focal findings or movement disorder noted  Skin - normal coloration and turgor      Assessment/Plan    ICD-10-CM    1. Prostate cancer ([facility] [address] [facility]  [address], [facility]     CANCELED: AMB POC URINALYSIS DIP STICK AUTO W/O MICRO     T2a. All tx options reviewed. He has elected to proceed with RALP and bilateral PLND. All risks, benefits and alternatives to the above mentioned procedure were discussed and the patient is willing to proceed at this time.     DELFINO TIRADO, DO

## 2022-07-14 ENCOUNTER — HOSPITAL ENCOUNTER (OUTPATIENT)
Dept: GENERAL RADIOLOGY | Age: 67
Discharge: HOME | End: 2022-07-17
Payer: MEDICARE

## 2022-07-14 ENCOUNTER — HOSPITAL ENCOUNTER (OUTPATIENT)
Dept: PREADMISSION TESTING | Age: 67
Discharge: HOME | End: 2022-07-17
Payer: MEDICARE

## 2022-07-14 VITALS
BODY MASS INDEX: 29.01 KG/M2 | HEART RATE: 88 BPM | HEIGHT: 68 IN | RESPIRATION RATE: 18 BRPM | DIASTOLIC BLOOD PRESSURE: 80 MMHG | SYSTOLIC BLOOD PRESSURE: 170 MMHG | WEIGHT: 191.4 LBS | TEMPERATURE: 97.9 F

## 2022-07-14 DIAGNOSIS — C61 PROSTATE CANCER (HCC): ICD-10-CM

## 2022-07-14 LAB
ANION GAP SERPL CALC-SCNC: 5 MMOL/L (ref 7–16)
APPEARANCE UR: CLEAR
APTT PPP: 27.1 SEC (ref 24.1–35.1)
BASOPHILS # BLD: 0 K/UL (ref 0–0.2)
BASOPHILS NFR BLD: 0 % (ref 0–2)
BILIRUB UR QL: NEGATIVE
BUN SERPL-MCNC: 16 MG/DL (ref 8–23)
CALCIUM SERPL-MCNC: 9.1 MG/DL (ref 8.3–10.4)
CHLORIDE SERPL-SCNC: 104 MMOL/L (ref 98–107)
CO2 SERPL-SCNC: 26 MMOL/L (ref 21–32)
COLOR UR: ABNORMAL
CREAT SERPL-MCNC: 0.86 MG/DL (ref 0.8–1.5)
DIFFERENTIAL METHOD BLD: ABNORMAL
EOSINOPHIL # BLD: 0.1 K/UL (ref 0–0.8)
EOSINOPHIL NFR BLD: 3 % (ref 0.5–7.8)
ERYTHROCYTE [DISTWIDTH] IN BLOOD BY AUTOMATED COUNT: 12.1 % (ref 11.9–14.6)
GLUCOSE SERPL-MCNC: 304 MG/DL (ref 65–100)
GLUCOSE UR STRIP.AUTO-MCNC: >1000 MG/DL
HCT VFR BLD AUTO: 39.8 % (ref 41.1–50.3)
HGB BLD-MCNC: 13.8 G/DL (ref 13.6–17.2)
HGB UR QL STRIP: NEGATIVE
IMM GRANULOCYTES # BLD AUTO: 0 K/UL (ref 0–0.5)
IMM GRANULOCYTES NFR BLD AUTO: 0 % (ref 0–5)
INR PPP: 0.9
KETONES UR QL STRIP.AUTO: NEGATIVE MG/DL
LEUKOCYTE ESTERASE UR QL STRIP.AUTO: NEGATIVE
LYMPHOCYTES # BLD: 1.8 K/UL (ref 0.5–4.6)
LYMPHOCYTES NFR BLD: 37 % (ref 13–44)
MCH RBC QN AUTO: 30.7 PG (ref 26.1–32.9)
MCHC RBC AUTO-ENTMCNC: 34.7 G/DL (ref 31.4–35)
MCV RBC AUTO: 88.4 FL (ref 79.6–97.8)
MONOCYTES # BLD: 0.4 K/UL (ref 0.1–1.3)
MONOCYTES NFR BLD: 8 % (ref 4–12)
NEUTS SEG # BLD: 2.5 K/UL (ref 1.7–8.2)
NEUTS SEG NFR BLD: 52 % (ref 43–78)
NITRITE UR QL STRIP.AUTO: NEGATIVE
NRBC # BLD: 0 K/UL (ref 0–0.2)
PH UR STRIP: 6.5 [PH] (ref 5–9)
PLATELET # BLD AUTO: 222 K/UL (ref 150–450)
PMV BLD AUTO: 10.4 FL (ref 9.4–12.3)
POTASSIUM SERPL-SCNC: 3.8 MMOL/L (ref 3.5–5.1)
PROT UR STRIP-MCNC: NEGATIVE MG/DL
PROTHROMBIN TIME: 12.9 SEC (ref 12.6–14.5)
RBC # BLD AUTO: 4.5 M/UL (ref 4.23–5.6)
SODIUM SERPL-SCNC: 135 MMOL/L (ref 136–145)
SP GR UR REFRACTOMETRY: 1.03 (ref 1–1.02)
UROBILINOGEN UR QL STRIP.AUTO: 0.2 EU/DL (ref 0.2–1)
WBC # BLD AUTO: 4.8 K/UL (ref 4.3–11.1)

## 2022-07-14 PROCEDURE — 85025 COMPLETE CBC W/AUTO DIFF WBC: CPT

## 2022-07-14 PROCEDURE — 85610 PROTHROMBIN TIME: CPT

## 2022-07-14 PROCEDURE — 80048 BASIC METABOLIC PNL TOTAL CA: CPT

## 2022-07-14 PROCEDURE — 81003 URINALYSIS AUTO W/O SCOPE: CPT

## 2022-07-14 PROCEDURE — 71046 X-RAY EXAM CHEST 2 VIEWS: CPT

## 2022-07-14 PROCEDURE — 85730 THROMBOPLASTIN TIME PARTIAL: CPT

## 2022-07-14 PROCEDURE — 87086 URINE CULTURE/COLONY COUNT: CPT

## 2022-07-14 NOTE — PROGRESS NOTES
Results for Beth Molina" (MRN [id number]) as of 7/14/2022 16:31   Ref.  Range 7/14/2022 13:00   Sodium Latest Ref Range: 136 - 145 mmol/L 135 (L)   Potassium Latest Ref Range: 3.5 - 5.1 mmol/L 3.8   Chloride Latest Ref Range: 98 - 107 mmol/L 104   CO2 Latest Ref Range: 21 - 32 mmol/L 26   BUN,BUNPL Latest Ref Range: 8 - 23 MG/DL 16   Creatinine Latest Ref Range: 0.8 - 1.5 MG/DL 0.86   Anion Gap Latest Ref Range: 7 - 16 mmol/L 5 (L)   GFR Non- Latest Ref Range: >60 ml/min/1.73m2 >60   GFR African American Latest Ref Range: >60 ml/min/1.73m2 >60   GLUCOSE, FASTING,GF Latest Ref Range: 65 - 100 mg/dL 304 (H)   CALCIUM, SERUM, [id number] Latest Ref Range: 8.3 - 10.4 MG/DL 9.1   WBC Latest Ref Range: 4.3 - 11.1 K/uL 4.8   RBC Latest Ref Range: 4.23 - 5.6 M/uL 4.50   Hemoglobin Quant Latest Ref Range: 13.6 - 17.2 g/dL 13.8   Hematocrit Latest Ref Range: 41.1 - 50.3 % 39.8 (L)   MCV Latest Ref Range: 79.6 - 97.8 FL 88.4   MCH Latest Ref Range: 26.1 - 32.9 PG 30.7   MCHC Latest Ref Range: 31.4 - 35.0 g/dL 34.7   MPV Latest Ref Range: 9.4 - 12.3 FL 10.4   RDW Latest Ref Range: 11.9 - 14.6 % 12.1   Platelet Count Latest Ref Range: 150 - 450 K/uL 222   Absolute Mono # Latest Ref Range: 0.1 - 1.3 K/UL 0.4   Eosinophils % Latest Ref Range: 0.5 - 7.8 % 3   Basophils Absolute Latest Ref Range: 0.0 - 0.2 K/UL 0.0   Differential Type Latest Units:   AUTOMATED   Seg Neutrophils Latest Ref Range: 43 - 78 % 52   Segs Absolute Latest Ref Range: 1.7 - 8.2 K/UL 2.5   Lymphocytes Latest Ref Range: 13 - 44 % 37   Absolute Lymph # Latest Ref Range: 0.5 - 4.6 K/UL 1.8   Monocytes Latest Ref Range: 4.0 - 12.0 % 8   Absolute Eos # Latest Ref Range: 0.0 - 0.8 K/UL 0.1   Basophils Latest Ref Range: 0.0 - 2.0 % 0   Immature Granulocytes Latest Ref Range: 0.0 - 5.0 % 0   Nucleated Red Blood Cells Latest Ref Range: 0.0 - 0.2 K/uL 0.00   Absolute Immature Granulocyte Latest Ref Range: 0.0 - 0.5 K/UL 0.0   Prothrombin Time Latest Ref Range: 12.6 - 14.5 sec 12.9   INR Latest Units:   0.9   PTT Latest Ref Range: 24.1 - 35.1 SEC 27.1   CULTURE, URINE Unknown Rpt   Color, UA Latest Units:   YELLOW/STRAW   Glucose, UA Latest Units: mg/dL >1000   Bilirubin, Urine Latest Ref Range: NEG   Negative   Ketones, Urine Latest Ref Range: NEG mg/dL Negative   Specific Gravity, UA Latest Ref Range: 1.001 - 1.023   1.033 (H)   Blood, Urine Latest Ref Range: NEG   Negative   Protein, UA Latest Ref Range: NEG mg/dL Negative   Urobilinogen, Urine Latest Ref Range: 0.2 - 1.0 EU/dL 0.2   Nitrite, Urine Latest Ref Range: NEG   Negative   Leukocyte Esterase, Urine Latest Ref Range: NEG   Negative   Appearance Latest Units:   CLEAR   pH, Urine Latest Ref Range: 5.0 - 9.0   6.5   Reviewed  Glucose reviewed with Dr Kristina Diane. Call placed and message left for pt to follow Diabatic diet , take meds as prescribed . Pt instructed that surgery could be postponed if blood sugar is not controlled.

## 2022-07-14 NOTE — PROGRESS NOTES
Pt did not show for PAT appointment. Call placed and message left for pt to reschedule another appointment with Dr Marti's office.

## 2022-07-14 NOTE — PROGRESS NOTES
PLEASE CONTINUE TAKING ALL PRESCRIPTION MEDICATIONS UP TO THE DAY OF SURGERY UNLESS OTHERWISE DIRECTED BELOW. DISCONTINUE all vitamins and supplements 7 days prior to surgery. DISCONTINUE Non-Steriodal Anti-Inflammatory (NSAIDS) such as Advil and Aleve 5 days prior to surgery. Home Medications to take  the day of surgery    Asprin 81 mg, Levothyroxin, Ativan as needed,        28 units of 70/30 night before surgery. Home Medications   to Hold   No metformin Morning of Surgery         Comments       On the day before surgery please take Acetaminophen 1000mg in the morning and then again before bed. You may substitute for Tylenol 650 mg. Please do not bring home medications with you on the day of surgery unless otherwise directed by your nurse. If you are instructed to bring home medications, please give them to your nurse as they will be administered by the nursing staff. If you have any questions, please call [facility] [phone] or [address] [place] [address] [phone]. A copy of this note was provided to the patient for reference.

## 2022-07-14 NOTE — PROGRESS NOTES
Patient verified name and     Order for consent  found in EHR and matches case posting; patient verified. Type 3 surgery, walk in assessment complete. Labs per surgeon: cbc,bmp,ua,ua cult, pt,ptt cxr; results pending T/s DOS  Labs per anesthesia protocol: cbc,bmp; T/S DOS  results pending  EK22    Hospital approved surgical skin cleanser and instructions given per hospital policy. Patient provided with and instructed on educational handouts including Guide to Surgery, Pain Management, Hand Hygiene, Blood Transfusion Education, and [place] [facility] Brochure. Patient answered medical/surgical history questions at their best of ability. All prior to admission medications documented in [facility]. Original medication prescription bottle not visualized during patient appointment. Patient instructed to hold all vitamins 7 days prior to surgery and NSAIDS 5 days prior to surgery, patient verbalized understanding. Patient teach back successful and patient demonstrates knowledge of instructions.

## 2022-07-16 LAB
BACTERIA SPEC CULT: NORMAL
SERVICE CMNT-IMP: NORMAL

## 2022-07-18 ENCOUNTER — HOSPITAL ENCOUNTER (OUTPATIENT)
Dept: PHYSICAL THERAPY | Age: 67
Setting detail: RECURRING SERIES
Discharge: HOME | End: 2022-07-21
Payer: MEDICARE

## 2022-07-18 PROCEDURE — 97110 THERAPEUTIC EXERCISES: CPT

## 2022-07-18 PROCEDURE — 97530 THERAPEUTIC ACTIVITIES: CPT

## 2022-07-18 PROCEDURE — 97161 PT EVAL LOW COMPLEX 20 MIN: CPT

## 2022-07-18 ASSESSMENT — PAIN SCALES - GENERAL: PAINLEVEL_OUTOF10: 6

## 2022-07-18 NOTE — THERAPY EVALUATION
Oneil Wilkerson  : 1955  Primary: Rhonda [place] Medicare Advantage [facility]  Secondary:  [address]  [place] [address]  Phone: [phone]  Fax: [phone] Plan Frequency: will see once and then 3 weeks after surgery  Plan of Care/Certification Expiration Date: 22    PT Visit Info: Total # of Visits to Date: 1    OUTPATIENT PHYSICAL THERAPY:OP NOTE TYPE: Initial Assessment 2022               Episode  Appt Desk         Treatment Diagnosis:  Lack of coordination (muscle incoordination) (R27.8)  Hesitancy of micturition (R39.11)  Low back pain (M54.5 )  Malignant neoplasm of prostate (C61)  * No diagnoses found *  Medical/Referring Diagnosis:  Malignant neoplasm of prostate [C61]  Referring Physician:  Elvis Whalen DO MD Orders:  PT Eval and Treat pre-prostatectomy pelvic floor therapy  Return MD Appt:  22  Date of Onset:  Onset Date: 22   Allergies:  Ticagrelor and Clopidogrel  Restrictions/Precautions:    No data recordedNo data recorded   Medications Last Reviewed:  2022     SUBJECTIVE   History of Injury/Illness (Reason for Referral): The patient was diagnosed with prostate cancer 5/3/22. He will be undergoing a RALP 22. Patient Stated Goal(s):  \"help me learn to pee\"  Initial:     6/10 Post Session:     6/10  Past Medical History/Comorbidities:   Mr. Ashutosh Swann  has a past medical history of Arthritis, Benign hypertensive heart disease without heart failure, Chronic pain, Coronary atherosclerosis of native coronary vessel, Diabetes ([facility] DM type 2 (diabetes mellitus, type 2) ([facility] Dyspnea, Hypertension, Malignant neoplasm of prostate ([facility] and Thyroid disease. Mr. Ashutosh Swann  has a past surgical history that includes pr cardiac surg procedure unlist; neurological surgery (2017); pr abdomen surgery proc unlisted; and Cholecystectomy.   Past Medical History:   Diagnosis Date    Arthritis     Benign hypertensive heart disease without heart failure 1/8/2016    Chronic pain     chronic back pain    Coronary atherosclerosis of native coronary vessel 1/8/2016    Stress test 06/23/2021 EF 69%    Diabetes [address]     Type 2 po and insulin av -150 can Hypo below 80    DM type 2 (diabetes mellitus, type 2) ([facility] 1/8/2016    Dyspnea     Hypertension     managed with meds    Malignant neoplasm of prostate ([facility] [address] 6/14/2022    Thyroid disease     hypo- managed with med     Past Surgical History:   Procedure Laterality Date    CHOLECYSTECTOMY      NEUROLOGICAL SURGERY  06/2017    C5 Fusion    [place] ABDOMEN SURGERY PROC UNLISTED      GALLBLADDER    [place] CARDIAC SURG PROCEDURE UNLIST      stents in 2009. (a total of 3 heart st[facility] last one [facility] 2018[facility]       Social History/Living Environment:   Lives With: Spouse  Type of Home: [address] [place] [address],[address]: One level   Prior Level of Function/Work/Activity:   Prior level of function: Independent  Occupation: [employer] recorded   Learning:   Does the patient/guardian have any barriers to learning?: No barriers  Will there be a co-learner?: No  What is the preferred language of the patient/guardian?: English  Is an  required?: No  How does the patient/guardian prefer to learn new concepts?: Listening; Demonstration   Fall Risk Scale: Total Score: 0  Russell Fall Risk: Low (0-24)   Dominant Side:  right handed  Mr. Fairy Phalen is 77 yo male referred to pelvic floor physical therapy (PFPT) 2/2 prostate cancer by Cici Becker DO. Findings were found in routine screening. He will be undergoing RALP on 7/21/22. Urinary: Frequency 10 x/day, 3 x/night. Positive for urinary frequency and urinary hesitancy/intermittency. Negative for stress urinary incontinence, urge urinary incontinence, urinary urgency, incomplete emptying, dysuria, hematuria, nocturia, and nocturnal enuresis. Pt does not use pads for urinary protection; 0 pads per day (PPD). [] Right  [] Left  [x]N/T   Abdominal wall [] Right  [] Left  [x]N/T   Pubic symphysis [] Right  [] Left  [x]N/T     Breath assessment:  Observation: chest breathing dominant  Coordination of pelvic floor muscles with breath: no pelvic floor muscle excursion  Co-contraction of PFM with transversus abdominis: present    Special tests/Movement screens:   Single leg stance:  NT  Deep squat:  NT  Active Straight Leg Raise (ASLR):  NT    Diastasis Recti    At umbilicus    1 inch above umbilicus    1 inch below umbilicus    TA contraction        ASSESSMENT   Initial Assessment:  7/18/22  Ana Rosa Villasenor presents decreased coordination, strength and endurance of pelvic floor muscle pre operatively. Today he was educated on bladder health, kegel exercises, pelvic floor anatomy and role of musculature and precautions with catheter post operatively. He required verbal and tactile cuing for proper activation and isolation of pelvic floor muscles. He was able to contract muscle for 10 seconds with minimal breath holding and accessory muscle use of abdominals. He was given kegel exercises to do at home prior to surgery and once catheter is removed. He was educated on pain after surgery and precautions with kegel exercises. He was able to demonstrate improved coordination by the end of the session today. He will continue to benefit from skilled physical therapy to address above mentioned deficits and restore normal PFM function post operatively to minimize urinary incontinence. Problem List: (Impacting functional limitations):     Body Structures, Functions, Activity Limitations Requiring Skilled Therapeutic Intervention: Increased pain; Decreased strength   Therapy Prognosis:   Therapy Prognosis: Good   Assessment Complexity:   Medium Complexity  PLAN   Effective Dates: 7/18/22 TO Plan of Care/Certification Expiration Date: 08/16/22   Frequency/Duration: Plan Frequency: will see once and then 3 weeks after surgery Interventions Planned (Treatment may consist of any combination of the following):    Current Treatment Recommendations: Strengthening; Manual Therapy - Soft Tissue Mobilization; Home exercise program; Patient/Caregiver education & training; Therapeutic activities       Short-Term Functional Goals: Time Frame: 4 weeks  Patient will demonstrate I with basic PFM HEP to improve awareness, coordination, and timing of PFM. Patient will verbalize an understanding of pelvic anatomy and causes of post op incontinence. Patient will demonstrate understanding of and ability to teach back appropriate water intake, bladder irritants, toileting frequency, and positioning for improved self-management of symptoms. Discharge Goals:   DISCHARGE GOALS TO BE DETERMINED POST OPERATIVELY    Outcome Measure:      Expanded Prostate Cancer Index Composite for Clinical Practice (EPIC-CP)  Score:  Initial: 12  Urinary Incontinence symptom score: 0/12  Urinary Irritation/Obstruction symptoms score: 4/12  Bowel symptoms score: 0/12  Sexual symptoms score: 5/12  Vitality/hormonal symptoms score: 3/12  Overall Prostate Cancer QOL score: 12/60 Most Recent: X (Date: -- )   Interpretation of Score: This survey asks questions concerning certain urinary, bowel and sexual symptoms. Each question is scored on a 0-4 scale, 4 representing the greatest disability. There are 5 sub-scores, each out of 12 and a total overall symptom score out of 60. The higher the score, suggests a higher disability. Medical Necessity:   Patient is expected to demonstrate progress in   strength and coordination   to   Reduce risk of incontinence  . Reason For Services/Other Comments:  Patient   continues to demonstrate capacity to improve pelvic floor strength and coordination which will   Reduce the risk of incontinence  .   Total Duration:  Time In: 3853  Time Out: 0924    Regarding Evelyn Wilkerson's therapy, I certify that the treatment plan above will be carried out by a therapist or under their direction.   Thank you for this referral,  Rhonda Hurtado, JENNI     Referring Physician Signature: Sterrett, Parnell Lanes,  _______________________________ Date _____________        Post Session Pain  Charge Capture  PT Visit Info  POC Link  Treatment Note Link  MD Guidelines  [place]

## 2022-07-18 NOTE — PROGRESS NOTES
Quin Wilkerson  : 1955  Primary: [employer] Medicare Advantage o  Secondary:  [address] [address]  Phone: [phone]  Fax: [phone] Plan Frequency: will see once and then 3 weeks after surgery  Plan of Care/Certification Expiration Date: 22    PT Visit Info: Total # of Visits to Date: 1    OUTPATIENT PHYSICAL THERAPY:OP NOTE TYPE: Treatment Note 2022       Episode  }Appt Desk              Treatment Diagnosis:  Lack of coordination (muscle incoordination) (R27.8)  Low back pain (M54.5 )  Malignant neoplasm of prostate (C61)  Medical/Referring Diagnosis:  Malignant neoplasm of prostate [C61]  Referring Physician:  Darion Zazueta DO MD Orders:  PT Eval and Treat prostatectomy pelvic floor therapy  Date of Onset:  Onset Date: 22   Allergies:   Ticagrelor and Clopidogrel  Restrictions/Precautions:  Restrictions/Precautions: NoneNo data recorded   Interventions Planned (Treatment may consist of any combination of the following):    Current Treatment Recommendations: Strengthening; Manual Therapy - Soft Tissue Mobilization; Home exercise program; Patient/Caregiver education & training; Therapeutic activities   Subjective Comments: The patient reports he is worried about the surgery. he is hoping he will improve quickly. He likes to be active. Initial:}    6/10Post Session:       6/10  Medications Last Reviewed:  2022  Updated Objective Findings:  See evaluation note from today  Treatment   THERAPEUTIC EXERCISE: (15 minutes):    Exercises per grid below to improve strength and coordination. Required moderate verbal and tactile cues to promote proper body breathing techniques. Progressed repetitions as indicated.   TTHERAPEUTIC ACTIVITY: ( 15 minutes): Functional activity education regarding anatomy, pathology and role of pelvic floor muscle (PFM) function in relation to presenting symptoms and role of pelvic floor therapy in conservative treatment., Functional activity education on avoiding bladder irritants, substitutions for common irritants, recommended fluid intake (types and spacing), appropriate voiding frequency, weight management and overall contributing factors of bowel health on bladder health/function in order to improve independent self management. Patient was provided a list of common bladder irritants including caffeine, carbonation, alcohol, artificial sweeteners, chocolate, acidic drinks, and tomato based drinks. , and Instruction on coordinated pelvic floor and diaphragmatic breathing to improve kinesthetic awareness of pelvic muscle mobility and restore proper motor recruitment patterns with breathing, posture, and functional movement (e.g. appropriate lift/contraction with increased IAP such as a cough, laugh, sneeze to prevent incontinence as well as appropriate relaxation/drop to reduce pain with vaginal penetration). TA Educational Topic Notes Date Completed   Pathology/Anatomy/PFM Function  7/18/22   Bladder health education     Urinary urge suppression     The knack     Voiding strategies     Nocturia tips     Bowel/Bladder log     Bowel health education     Constipation care     Diarrhea/Fecal leakage     Colonic massage     Toilet positioning     Defecation dynamics     Sources of fiber     Return to intercourse/Dilator training     Sexual positioning     Lubricants/vaginal moisturizers     Vaginal irritants     Body mechanics     Posture/ergonomics     Diaphragmatic breathing  7/18/22   Resources and technology           No grid  The patient was instructed in correct Kegel's in both supine and standing. He was educated in bladder irritants, the need for a squatty potty, anatomy and surgery process, incontinence supplies  Treatment/Session Summary:    Treatment Assessment:  The patient needed verbal and tactile cues to coordinate pelvic floor contractions.   he is motivated to learn and improve. Communication/Consultation:   avoid bladder irritants, practice Kegel's, acquire incontinence supplies  Equipment provided today:  None  Recommendations/Intent for next treatment session: Next visit will focus on reassessment post surgery.     Total Treatment Billable Duration:  68 minutes   evaluation 30 min, therex 15 min, theracts 15 min  Time In: 5541  Time Out: 4553    ANGIE AMADOR, PT       Charge Capture  }Post Session Pain  PT Visit Info  [employer] Portal  MD Guidelines  Scanned Media  Benefits  MyChart    Future Appointments   Date Time Provider Unique Garyi   7/28/2022  3:45 PM Jennifer Marti DO [id number] [facility]   8/11/2022 11:00 AM Angie Zelaya, PT [facility]   1/19/2023  8:15 AM Staci Burger MD [facility]

## 2022-07-20 ENCOUNTER — ANESTHESIA EVENT (OUTPATIENT)
Dept: SURGERY | Age: 67
End: 2022-07-20
Payer: MEDICARE

## 2022-07-20 NOTE — PERIOP NOTE
Directly informed patient and or family member of pre op arrival time 0 on 7/21. All questions answered. Pre op instructions reviewed. Left contact information for any additional questions or needs.

## 2022-07-21 ENCOUNTER — HOSPITAL ENCOUNTER (OUTPATIENT)
Age: 67
Discharge: HOME | End: 2022-07-22
Attending: UROLOGY | Admitting: UROLOGY
Payer: MEDICARE

## 2022-07-21 ENCOUNTER — ANESTHESIA (OUTPATIENT)
Dept: SURGERY | Age: 67
End: 2022-07-21
Payer: MEDICARE

## 2022-07-21 DIAGNOSIS — C61 MALIGNANT NEOPLASM OF PROSTATE (HCC): ICD-10-CM

## 2022-07-21 DIAGNOSIS — C61 PROSTATE CANCER (HCC): Primary | ICD-10-CM

## 2022-07-21 LAB
ABO + RH BLD: NORMAL
BLOOD GROUP ANTIBODIES SERPL: NORMAL
GLUCOSE BLD STRIP.AUTO-MCNC: 148 MG/DL (ref 65–100)
GLUCOSE BLD STRIP.AUTO-MCNC: 173 MG/DL (ref 65–100)
GLUCOSE BLD STRIP.AUTO-MCNC: 179 MG/DL (ref 65–100)
GLUCOSE BLD STRIP.AUTO-MCNC: 179 MG/DL (ref 65–100)
GLUCOSE BLD STRIP.AUTO-MCNC: 180 MG/DL (ref 65–100)
HCT VFR BLD AUTO: 43.6 % (ref 41.1–50.3)
HGB BLD-MCNC: 14.4 G/DL (ref 13.6–17.2)
SERVICE CMNT-IMP: ABNORMAL
SPECIMEN EXP DATE BLD: NORMAL

## 2022-07-21 PROCEDURE — 6370000000 HC RX 637 (ALT 250 FOR IP): Performed by: ANESTHESIOLOGY

## 2022-07-21 PROCEDURE — 3600000019 HC SURGERY ROBOT ADDTL 15MIN: Performed by: UROLOGY

## 2022-07-21 PROCEDURE — 3600000009 HC SURGERY ROBOT BASE: Performed by: UROLOGY

## 2022-07-21 PROCEDURE — 7100000000 HC PACU RECOVERY - FIRST 15 MIN: Performed by: UROLOGY

## 2022-07-21 PROCEDURE — 6360000002 HC RX W HCPCS: Performed by: ANESTHESIOLOGY

## 2022-07-21 PROCEDURE — 88309 TISSUE EXAM BY PATHOLOGIST: CPT

## 2022-07-21 PROCEDURE — 6360000002 HC RX W HCPCS: Performed by: UROLOGY

## 2022-07-21 PROCEDURE — 2709999900 HC NON-CHARGEABLE SUPPLY: Performed by: UROLOGY

## 2022-07-21 PROCEDURE — 2580000003 HC RX 258: Performed by: ANESTHESIOLOGY

## 2022-07-21 PROCEDURE — 2580000003 HC RX 258: Performed by: UROLOGY

## 2022-07-21 PROCEDURE — 86901 BLOOD TYPING SEROLOGIC RH(D): CPT

## 2022-07-21 PROCEDURE — 7100000001 HC PACU RECOVERY - ADDTL 15 MIN: Performed by: UROLOGY

## 2022-07-21 PROCEDURE — S2900 ROBOTIC SURGICAL SYSTEM: HCPCS | Performed by: UROLOGY

## 2022-07-21 PROCEDURE — 6370000000 HC RX 637 (ALT 250 FOR IP): Performed by: UROLOGY

## 2022-07-21 PROCEDURE — 85018 HEMOGLOBIN: CPT

## 2022-07-21 PROCEDURE — 82962 GLUCOSE BLOOD TEST: CPT

## 2022-07-21 PROCEDURE — 38571 LAPAROSCOPY LYMPHADENECTOMY: CPT | Performed by: UROLOGY

## 2022-07-21 PROCEDURE — 2500000003 HC RX 250 WO HCPCS

## 2022-07-21 PROCEDURE — 88305 TISSUE EXAM BY PATHOLOGIST: CPT

## 2022-07-21 PROCEDURE — 6360000002 HC RX W HCPCS

## 2022-07-21 PROCEDURE — 2580000003 HC RX 258

## 2022-07-21 PROCEDURE — 3700000001 HC ADD 15 MINUTES (ANESTHESIA): Performed by: UROLOGY

## 2022-07-21 PROCEDURE — 2500000003 HC RX 250 WO HCPCS: Performed by: UROLOGY

## 2022-07-21 PROCEDURE — 3700000000 HC ANESTHESIA ATTENDED CARE: Performed by: UROLOGY

## 2022-07-21 PROCEDURE — 55866 LAPS SURG PRST8ECT RPBIC RAD: CPT | Performed by: UROLOGY

## 2022-07-21 RX ORDER — PROPOFOL 10 MG/ML
INJECTION, EMULSION INTRAVENOUS PRN
Status: DISCONTINUED | OUTPATIENT
Start: 2022-07-21 | End: 2022-07-21 | Stop reason: SDUPTHER

## 2022-07-21 RX ORDER — DOCUSATE SODIUM 100 MG/1
100 CAPSULE, LIQUID FILLED ORAL 2 TIMES DAILY
Status: DISCONTINUED | OUTPATIENT
Start: 2022-07-21 | End: 2022-07-22 | Stop reason: HOSPADM

## 2022-07-21 RX ORDER — LEVOTHYROXINE SODIUM 0.05 MG/1
100 TABLET ORAL
Status: DISCONTINUED | OUTPATIENT
Start: 2022-07-22 | End: 2022-07-22 | Stop reason: HOSPADM

## 2022-07-21 RX ORDER — ONDANSETRON 2 MG/ML
4 INJECTION INTRAMUSCULAR; INTRAVENOUS
Status: DISCONTINUED | OUTPATIENT
Start: 2022-07-21 | End: 2022-07-21 | Stop reason: HOSPADM

## 2022-07-21 RX ORDER — PHENAZOPYRIDINE HYDROCHLORIDE 95 MG/1
200 TABLET ORAL
Status: COMPLETED | OUTPATIENT
Start: 2022-07-21 | End: 2022-07-21

## 2022-07-21 RX ORDER — HYDROMORPHONE HYDROCHLORIDE 2 MG/ML
0.5 INJECTION, SOLUTION INTRAMUSCULAR; INTRAVENOUS; SUBCUTANEOUS EVERY 5 MIN PRN
Status: DISCONTINUED | OUTPATIENT
Start: 2022-07-21 | End: 2022-07-21 | Stop reason: HOSPADM

## 2022-07-21 RX ORDER — GLYCOPYRROLATE 0.2 MG/ML
INJECTION INTRAMUSCULAR; INTRAVENOUS PRN
Status: DISCONTINUED | OUTPATIENT
Start: 2022-07-21 | End: 2022-07-21 | Stop reason: SDUPTHER

## 2022-07-21 RX ORDER — OXYCODONE HYDROCHLORIDE 5 MG/1
10 TABLET ORAL 4 TIMES DAILY PRN
Status: DISCONTINUED | OUTPATIENT
Start: 2022-07-21 | End: 2022-07-22 | Stop reason: HOSPADM

## 2022-07-21 RX ORDER — ONDANSETRON 2 MG/ML
4 INJECTION INTRAMUSCULAR; INTRAVENOUS EVERY 6 HOURS PRN
Status: DISCONTINUED | OUTPATIENT
Start: 2022-07-21 | End: 2022-07-22 | Stop reason: HOSPADM

## 2022-07-21 RX ORDER — MIDAZOLAM HYDROCHLORIDE 2 MG/2ML
2 INJECTION, SOLUTION INTRAMUSCULAR; INTRAVENOUS
Status: COMPLETED | OUTPATIENT
Start: 2022-07-21 | End: 2022-07-21

## 2022-07-21 RX ORDER — LOSARTAN POTASSIUM 50 MG/1
50 TABLET ORAL DAILY
Status: DISCONTINUED | OUTPATIENT
Start: 2022-07-21 | End: 2022-07-22 | Stop reason: HOSPADM

## 2022-07-21 RX ORDER — ACETAMINOPHEN 325 MG/1
650 TABLET ORAL EVERY 4 HOURS PRN
Status: DISCONTINUED | OUTPATIENT
Start: 2022-07-21 | End: 2022-07-22 | Stop reason: HOSPADM

## 2022-07-21 RX ORDER — INSULIN LISPRO 100 [IU]/ML
0-8 INJECTION, SOLUTION INTRAVENOUS; SUBCUTANEOUS
Status: DISCONTINUED | OUTPATIENT
Start: 2022-07-21 | End: 2022-07-22 | Stop reason: HOSPADM

## 2022-07-21 RX ORDER — NEOSTIGMINE METHYLSULFATE 1 MG/ML
INJECTION, SOLUTION INTRAVENOUS PRN
Status: DISCONTINUED | OUTPATIENT
Start: 2022-07-21 | End: 2022-07-21 | Stop reason: SDUPTHER

## 2022-07-21 RX ORDER — LIDOCAINE HYDROCHLORIDE 20 MG/ML
INJECTION, SOLUTION EPIDURAL; INFILTRATION; INTRACAUDAL; PERINEURAL PRN
Status: DISCONTINUED | OUTPATIENT
Start: 2022-07-21 | End: 2022-07-21 | Stop reason: SDUPTHER

## 2022-07-21 RX ORDER — SODIUM CHLORIDE, SODIUM LACTATE, POTASSIUM CHLORIDE, CALCIUM CHLORIDE 600; 310; 30; 20 MG/100ML; MG/100ML; MG/100ML; MG/100ML
INJECTION, SOLUTION INTRAVENOUS CONTINUOUS
Status: DISCONTINUED | OUTPATIENT
Start: 2022-07-21 | End: 2022-07-21 | Stop reason: HOSPADM

## 2022-07-21 RX ORDER — ACETAMINOPHEN 500 MG
1000 TABLET ORAL ONCE
Status: COMPLETED | OUTPATIENT
Start: 2022-07-21 | End: 2022-07-21

## 2022-07-21 RX ORDER — SODIUM CHLORIDE, SODIUM LACTATE, POTASSIUM CHLORIDE, CALCIUM CHLORIDE 600; 310; 30; 20 MG/100ML; MG/100ML; MG/100ML; MG/100ML
INJECTION, SOLUTION INTRAVENOUS CONTINUOUS
Status: DISCONTINUED | OUTPATIENT
Start: 2022-07-21 | End: 2022-07-21

## 2022-07-21 RX ORDER — OXYCODONE HYDROCHLORIDE 5 MG/1
10 TABLET ORAL PRN
Status: DISCONTINUED | OUTPATIENT
Start: 2022-07-21 | End: 2022-07-21 | Stop reason: HOSPADM

## 2022-07-21 RX ORDER — LORAZEPAM 1 MG/1
1 TABLET ORAL EVERY 4 HOURS PRN
Status: DISCONTINUED | OUTPATIENT
Start: 2022-07-21 | End: 2022-07-22 | Stop reason: HOSPADM

## 2022-07-21 RX ORDER — ATROPA BELLADONNA AND OPIUM 16.2; 6 MG/1; MG/1
60 SUPPOSITORY RECTAL
Status: COMPLETED | OUTPATIENT
Start: 2022-07-21 | End: 2022-07-21

## 2022-07-21 RX ORDER — INSULIN GLARGINE 100 [IU]/ML
20 INJECTION, SOLUTION SUBCUTANEOUS NIGHTLY
Status: DISCONTINUED | OUTPATIENT
Start: 2022-07-21 | End: 2022-07-22 | Stop reason: HOSPADM

## 2022-07-21 RX ORDER — GLUCAGON 1 MG/ML
1 KIT INJECTION PRN
Status: DISCONTINUED | OUTPATIENT
Start: 2022-07-21 | End: 2022-07-22 | Stop reason: HOSPADM

## 2022-07-21 RX ORDER — SODIUM CHLORIDE 0.9 % (FLUSH) 0.9 %
5-40 SYRINGE (ML) INJECTION PRN
Status: DISCONTINUED | OUTPATIENT
Start: 2022-07-21 | End: 2022-07-21 | Stop reason: HOSPADM

## 2022-07-21 RX ORDER — MORPHINE SULFATE 2 MG/ML
2 INJECTION, SOLUTION INTRAMUSCULAR; INTRAVENOUS
Status: DISCONTINUED | OUTPATIENT
Start: 2022-07-21 | End: 2022-07-22 | Stop reason: HOSPADM

## 2022-07-21 RX ORDER — OXYBUTYNIN CHLORIDE 5 MG/1
5 TABLET ORAL 3 TIMES DAILY PRN
Status: DISCONTINUED | OUTPATIENT
Start: 2022-07-21 | End: 2022-07-22 | Stop reason: HOSPADM

## 2022-07-21 RX ORDER — OXYCODONE HYDROCHLORIDE 5 MG/1
5 TABLET ORAL PRN
Status: DISCONTINUED | OUTPATIENT
Start: 2022-07-21 | End: 2022-07-21 | Stop reason: HOSPADM

## 2022-07-21 RX ORDER — SODIUM CHLORIDE 9 MG/ML
INJECTION, SOLUTION INTRAVENOUS CONTINUOUS
Status: DISCONTINUED | OUTPATIENT
Start: 2022-07-21 | End: 2022-07-22 | Stop reason: HOSPADM

## 2022-07-21 RX ORDER — ROCURONIUM BROMIDE 10 MG/ML
INJECTION, SOLUTION INTRAVENOUS PRN
Status: DISCONTINUED | OUTPATIENT
Start: 2022-07-21 | End: 2022-07-21 | Stop reason: SDUPTHER

## 2022-07-21 RX ORDER — FENTANYL CITRATE 50 UG/ML
100 INJECTION, SOLUTION INTRAMUSCULAR; INTRAVENOUS
Status: DISCONTINUED | OUTPATIENT
Start: 2022-07-21 | End: 2022-07-21 | Stop reason: HOSPADM

## 2022-07-21 RX ORDER — INSULIN LISPRO 100 [IU]/ML
0-4 INJECTION, SOLUTION INTRAVENOUS; SUBCUTANEOUS NIGHTLY
Status: DISCONTINUED | OUTPATIENT
Start: 2022-07-21 | End: 2022-07-22 | Stop reason: HOSPADM

## 2022-07-21 RX ORDER — ROSUVASTATIN CALCIUM 5 MG/1
5 TABLET, COATED ORAL DAILY
Status: DISCONTINUED | OUTPATIENT
Start: 2022-07-21 | End: 2022-07-22 | Stop reason: HOSPADM

## 2022-07-21 RX ORDER — SODIUM CHLORIDE 0.9 % (FLUSH) 0.9 %
5-40 SYRINGE (ML) INJECTION EVERY 12 HOURS SCHEDULED
Status: DISCONTINUED | OUTPATIENT
Start: 2022-07-21 | End: 2022-07-21 | Stop reason: HOSPADM

## 2022-07-21 RX ORDER — ASPIRIN 81 MG/1
81 TABLET, CHEWABLE ORAL EVERY EVENING
Status: DISCONTINUED | OUTPATIENT
Start: 2022-07-21 | End: 2022-07-22 | Stop reason: HOSPADM

## 2022-07-21 RX ORDER — DIPHENHYDRAMINE HYDROCHLORIDE 50 MG/ML
12.5 INJECTION INTRAMUSCULAR; INTRAVENOUS
Status: DISCONTINUED | OUTPATIENT
Start: 2022-07-21 | End: 2022-07-21 | Stop reason: HOSPADM

## 2022-07-21 RX ORDER — ASPIRIN 81 MG/1
81 TABLET, CHEWABLE ORAL ONCE
Status: COMPLETED | OUTPATIENT
Start: 2022-07-21 | End: 2022-07-21

## 2022-07-21 RX ORDER — HYDROMORPHONE HYDROCHLORIDE 1 MG/ML
INJECTION, SOLUTION INTRAMUSCULAR; INTRAVENOUS; SUBCUTANEOUS PRN
Status: DISCONTINUED | OUTPATIENT
Start: 2022-07-21 | End: 2022-07-21 | Stop reason: SDUPTHER

## 2022-07-21 RX ORDER — DEXTROSE MONOHYDRATE 100 MG/ML
INJECTION, SOLUTION INTRAVENOUS CONTINUOUS PRN
Status: DISCONTINUED | OUTPATIENT
Start: 2022-07-21 | End: 2022-07-22 | Stop reason: HOSPADM

## 2022-07-21 RX ORDER — BUPIVACAINE HYDROCHLORIDE 2.5 MG/ML
INJECTION, SOLUTION EPIDURAL; INFILTRATION; INTRACAUDAL PRN
Status: DISCONTINUED | OUTPATIENT
Start: 2022-07-21 | End: 2022-07-21 | Stop reason: ALTCHOICE

## 2022-07-21 RX ORDER — ONDANSETRON 2 MG/ML
INJECTION INTRAMUSCULAR; INTRAVENOUS PRN
Status: DISCONTINUED | OUTPATIENT
Start: 2022-07-21 | End: 2022-07-21 | Stop reason: SDUPTHER

## 2022-07-21 RX ORDER — HYDROMORPHONE HYDROCHLORIDE 2 MG/ML
0.25 INJECTION, SOLUTION INTRAMUSCULAR; INTRAVENOUS; SUBCUTANEOUS EVERY 5 MIN PRN
Status: DISCONTINUED | OUTPATIENT
Start: 2022-07-21 | End: 2022-07-21 | Stop reason: HOSPADM

## 2022-07-21 RX ADMIN — HYDROMORPHONE HYDROCHLORIDE 0.5 MG: 2 INJECTION, SOLUTION INTRAMUSCULAR; INTRAVENOUS; SUBCUTANEOUS at 10:30

## 2022-07-21 RX ADMIN — SODIUM CHLORIDE, SODIUM LACTATE, POTASSIUM CHLORIDE, AND CALCIUM CHLORIDE: 600; 310; 30; 20 INJECTION, SOLUTION INTRAVENOUS at 08:18

## 2022-07-21 RX ADMIN — MORPHINE SULFATE 2 MG: 2 INJECTION, SOLUTION INTRAMUSCULAR; INTRAVENOUS at 21:26

## 2022-07-21 RX ADMIN — Medication 2000 MG: at 08:00

## 2022-07-21 RX ADMIN — OXYBUTYNIN CHLORIDE 5 MG: 5 TABLET ORAL at 16:59

## 2022-07-21 RX ADMIN — ROCURONIUM BROMIDE 5 MG: 50 INJECTION, SOLUTION INTRAVENOUS at 09:31

## 2022-07-21 RX ADMIN — OXYCODONE 10 MG: 5 TABLET ORAL at 20:00

## 2022-07-21 RX ADMIN — MIDAZOLAM 2 MG: 1 INJECTION INTRAMUSCULAR; INTRAVENOUS at 06:52

## 2022-07-21 RX ADMIN — DOCUSATE SODIUM 100 MG: 100 CAPSULE, LIQUID FILLED ORAL at 16:57

## 2022-07-21 RX ADMIN — MORPHINE SULFATE 2 MG: 2 INJECTION, SOLUTION INTRAMUSCULAR; INTRAVENOUS at 16:49

## 2022-07-21 RX ADMIN — CEFAZOLIN 1000 MG: 1 INJECTION, POWDER, FOR SOLUTION INTRAMUSCULAR; INTRAVENOUS at 23:33

## 2022-07-21 RX ADMIN — HYDROMORPHONE HYDROCHLORIDE 0.5 MG: 2 INJECTION, SOLUTION INTRAMUSCULAR; INTRAVENOUS; SUBCUTANEOUS at 10:15

## 2022-07-21 RX ADMIN — GLYCOPYRROLATE 0.6 MG: 0.2 INJECTION, SOLUTION INTRAMUSCULAR; INTRAVENOUS at 09:45

## 2022-07-21 RX ADMIN — ACETAMINOPHEN 1000 MG: 500 TABLET, FILM COATED ORAL at 06:29

## 2022-07-21 RX ADMIN — SODIUM CHLORIDE: 9 INJECTION, SOLUTION INTRAVENOUS at 23:34

## 2022-07-21 RX ADMIN — INSULIN GLARGINE 20 UNITS: 100 INJECTION, SOLUTION SUBCUTANEOUS at 21:18

## 2022-07-21 RX ADMIN — PHENYLEPHRINE HYDROCHLORIDE 100 MCG: 10 INJECTION INTRAVENOUS at 08:01

## 2022-07-21 RX ADMIN — PROPOFOL 200 MG: 10 INJECTION, EMULSION INTRAVENOUS at 07:43

## 2022-07-21 RX ADMIN — DOCUSATE SODIUM 100 MG: 100 CAPSULE, LIQUID FILLED ORAL at 21:20

## 2022-07-21 RX ADMIN — ROCURONIUM BROMIDE 50 MG: 50 INJECTION, SOLUTION INTRAVENOUS at 07:44

## 2022-07-21 RX ADMIN — ONDANSETRON 4 MG: 2 INJECTION INTRAMUSCULAR; INTRAVENOUS at 09:25

## 2022-07-21 RX ADMIN — CEFAZOLIN 1000 MG: 1 INJECTION, POWDER, FOR SOLUTION INTRAMUSCULAR; INTRAVENOUS at 16:56

## 2022-07-21 RX ADMIN — HYDROMORPHONE HYDROCHLORIDE 0.5 MG: 1 INJECTION, SOLUTION INTRAMUSCULAR; INTRAVENOUS; SUBCUTANEOUS at 08:48

## 2022-07-21 RX ADMIN — Medication 3 MG: at 09:45

## 2022-07-21 RX ADMIN — LORAZEPAM 1 MG: 1 TABLET ORAL at 19:17

## 2022-07-21 RX ADMIN — ROCURONIUM BROMIDE 10 MG: 50 INJECTION, SOLUTION INTRAVENOUS at 08:53

## 2022-07-21 RX ADMIN — ONDANSETRON 4 MG: 2 INJECTION INTRAMUSCULAR; INTRAVENOUS at 16:49

## 2022-07-21 RX ADMIN — ROCURONIUM BROMIDE 10 MG: 50 INJECTION, SOLUTION INTRAVENOUS at 08:27

## 2022-07-21 RX ADMIN — URINARY PAIN RELIEF 190 MG: 95 TABLET ORAL at 11:51

## 2022-07-21 RX ADMIN — SODIUM CHLORIDE: 9 INJECTION, SOLUTION INTRAVENOUS at 15:51

## 2022-07-21 RX ADMIN — FENTANYL CITRATE 50 MCG: 50 INJECTION INTRAMUSCULAR; INTRAVENOUS at 07:43

## 2022-07-21 RX ADMIN — SODIUM CHLORIDE, SODIUM LACTATE, POTASSIUM CHLORIDE, AND CALCIUM CHLORIDE: 600; 310; 30; 20 INJECTION, SOLUTION INTRAVENOUS at 06:04

## 2022-07-21 RX ADMIN — ATROPA BELLADONNA AND OPIUM 60 MG: 16.2; 6 SUPPOSITORY RECTAL at 12:39

## 2022-07-21 RX ADMIN — ASPIRIN 81 MG: 81 TABLET, CHEWABLE ORAL at 07:12

## 2022-07-21 RX ADMIN — LIDOCAINE HYDROCHLORIDE 100 MG: 20 INJECTION, SOLUTION EPIDURAL; INFILTRATION; INTRACAUDAL; PERINEURAL at 07:43

## 2022-07-21 ASSESSMENT — PAIN DESCRIPTION - ORIENTATION
ORIENTATION: INNER
ORIENTATION: INNER
ORIENTATION: RIGHT;LEFT
ORIENTATION: LEFT
ORIENTATION: RIGHT;LEFT

## 2022-07-21 ASSESSMENT — PAIN DESCRIPTION - DESCRIPTORS
DESCRIPTORS: BURNING
DESCRIPTORS: BURNING
DESCRIPTORS: ACHING
DESCRIPTORS: BURNING
DESCRIPTORS: DISCOMFORT
DESCRIPTORS: ACHING
DESCRIPTORS: BURNING
DESCRIPTORS: BURNING

## 2022-07-21 ASSESSMENT — PAIN DESCRIPTION - LOCATION
LOCATION: PENIS
LOCATION: BUTTOCKS
LOCATION: PENIS
LOCATION: BUTTOCKS
LOCATION: BUTTOCKS;PENIS

## 2022-07-21 ASSESSMENT — PAIN SCALES - GENERAL
PAINLEVEL_OUTOF10: 6
PAINLEVEL_OUTOF10: 5
PAINLEVEL_OUTOF10: 5
PAINLEVEL_OUTOF10: 6
PAINLEVEL_OUTOF10: 8
PAINLEVEL_OUTOF10: 6

## 2022-07-21 ASSESSMENT — PAIN - FUNCTIONAL ASSESSMENT
PAIN_FUNCTIONAL_ASSESSMENT: ACTIVITIES ARE NOT PREVENTED
PAIN_FUNCTIONAL_ASSESSMENT: 0-10
PAIN_FUNCTIONAL_ASSESSMENT: ACTIVITIES ARE NOT PREVENTED
PAIN_FUNCTIONAL_ASSESSMENT: ACTIVITIES ARE NOT PREVENTED
PAIN_FUNCTIONAL_ASSESSMENT: 0-10
PAIN_FUNCTIONAL_ASSESSMENT: 0-10

## 2022-07-21 ASSESSMENT — PAIN DESCRIPTION - PAIN TYPE
TYPE: ACUTE PAIN;SURGICAL PAIN
TYPE: ACUTE PAIN

## 2022-07-21 NOTE — PERIOP NOTE
Figueroa at [facility] to assess randolph drain. MD was able to correct drain location and stitch the drain in place. Pt in NAD and tolerated procedure well. Will continue to monitor drain.

## 2022-07-21 NOTE — FLOWSHEET NOTE
07/21/22 1350   Dual Clinician Skin Assessment   Dual Skin Assessment (4 Eyes) WDL   Second Clinical  (First and Last Name) Mitzy Wagner RN   Skin Integumentary    Skin Integumentary (WDL) X   Skin Integrity Incision (see LDA)  (x5 lap sites and LEO to RLQ)

## 2022-07-21 NOTE — PROGRESS NOTES
TRANSFER - IN REPORT:    Verbal report received from Jannie Sun on Cassie Sweeney  being received from PACU for routine post-op      Report consisted of patient's Situation, Background, Assessment and   Recommendations(SBAR). Information from the following report(s) Nurse Handoff Report, Surgery Report, Intake/Output, MAR, and Recent Results was reviewed with the receiving nurse. Opportunity for questions and clarification was provided. Assessment will be completed upon patient's arrival to unit and care assumed.

## 2022-07-21 NOTE — ANESTHESIA PRE PROCEDURE
Department of Anesthesiology  Preprocedure Note       Name:  Quentin De Dios   Age:  78 y.o.  :  1955                                          MRN:  [id number]         Date:  2022      Surgeon: Tomy Benjamin):  Yassine Marti DO    Procedure: Procedure(s):  PROSTATECTOMY LAPAROSCOPIC ROBOTIC/possible bilateral lymph node dissection    Medications prior to admission:   Prior to Admission medications    Medication Sig Start Date End Date Taking? Authorizing Provider   losartan (COZAAR) 50 mg tablet TAKE ONE TABLET BY MOUTH ONE TIME DAILY 22   Zhanna Vital MD   Aspirin 325 MG CAPS Take 81 mg by mouth every evening     Ar Automatic Reconciliation   insulin 70-30 (HUMULIN;NOVOLIN) (70-30) 100 UNIT per ML injection vial Inject 35 Units into the skin nightly     Ar Automatic Reconciliation   levothyroxine (SYNTHROID) 100 MCG tablet Take 100 mcg by mouth every morning (before breakfast)    Ar Automatic Reconciliation   loratadine (CLARITIN) 10 MG tablet Take 10 mg by mouth daily as needed    Ar Automatic Reconciliation   LORazepam (ATIVAN) 1 MG tablet Take by mouth every 4 hours as needed. Ar Automatic Reconciliation   metFORMIN (GLUCOPHAGE) 500 MG tablet Take 500 mg by mouth 2 times daily (with meals)    Ar Automatic Reconciliation   oxyCODONE HCl (OXY-IR) 10 MG immediate release tablet Take 10 mg by mouth 4 times daily as needed.     Ar Automatic Reconciliation   rosuvastatin (CRESTOR) 5 MG tablet Take 5 mg by mouth daily     Ar Automatic Reconciliation       Current medications:    Current Facility-Administered Medications   Medication Dose Route Frequency Provider Last Rate Last Admin    fentaNYL (SUBLIMAZE) injection 100 mcg  100 mcg IntraVENous Once PRN Cynthia Stein MD        lactated ringers infusion   IntraVENous Continuous Cynhtia Stein  mL/hr at 22 0625 NoRateChange at 22 0625    sodium chloride flush 0.9 % injection 5-40 mL  5-40 mL IntraVENous 2 times per day Ann Pollack MD        sodium chloride flush 0.9 % injection 5-40 mL  5-40 mL IntraVENous PRN Ann Pollack MD        ceFAZolin (ANCEF) 2000 mg in sterile water 20 mL IV syringe  2,000 mg IntraVENous On Call to Flavorvanil Holland,            Allergies: Allergies   Allergen Reactions    Ticagrelor Shortness Of Breath     Ill-tempered    Clopidogrel Rash       Problem List:    Patient Active Problem List   Diagnosis Code    Coronary atherosclerosis of native coronary vessel I25.10    DM type 2 (diabetes mellitus, type 2) ([facility]) E11.9    S/P PTCA (percutaneous transluminal coronary angioplasty) Z98.61    Benign hypertensive heart disease without heart failure I11.9    Dyspnea R06.00    Malignant neoplasm of prostate ([address] C61       Past Medical History:        Diagnosis Date    Arthritis     Benign hypertensive heart disease without heart failure 1/8/2016    Chronic pain     chronic back pain    Coronary atherosclerosis of native coronary vessel 1/8/2016    Stress test 06/23/2021 EF 69%    Diabetes ([address]     Type 2 po and insulin av -150 can Hypo below 80    DM type 2 (diabetes mellitus, type 2) ([address] 1/8/2016    Dyspnea     Hypertension     managed with meds    Malignant neoplasm of prostate ([address] 6/14/2022    Thyroid disease     hypo- managed with med       Past Surgical History:        Procedure Laterality Date    CHOLECYSTECTOMY      NEUROLOGICAL SURGERY  06/2017    C5 Fusion    [place] ABDOMEN SURGERY PROC UNLISTED      GALLBLADDER    [place] CARDIAC SURG PROCEDURE UNLIST      stents in 2009. (a total of 3 heart stents last one placed 2018) [facility]       Social History:    Social History     Tobacco Use    Smoking status: Never    Smokeless tobacco: Never   Substance Use Topics    Alcohol use:  No                                Counseling given: Not Answered      Vital Signs (Current):   Vitals:    07/21/22 0534   BP: 139/76   Pulse: 88   Resp: 16 Temp: 98.2 °F (36.8 °C)   TempSrc: Oral   SpO2: 98%   Weight: 184 lb 6.4 oz (83.6 kg)   Height: 5' 9\" (1.753 m)                                              BP Readings from Last 3 Encounters:   07/21/22 139/76   07/14/22 (!) 170/80   07/05/22 134/80       NPO Status: Time of last liquid consumption: 0000                        Time of last solid consumption: 0000                        Date of last liquid consumption: 07/20/22                        Date of last solid food consumption: 07/20/22    BMI:   Wt Readings from Last 3 Encounters:   07/21/22 184 lb 6.4 oz (83.6 kg)   07/14/22 191 lb 6.4 oz (86.8 kg)   07/05/22 190 lb (86.2 kg)     Body mass index is 27.23 kg/m².     CBC:   Lab Results   Component Value Date/Time    WBC 4.8 07/14/2022 01:00 PM    RBC 4.50 07/14/2022 01:00 PM    HGB 13.8 07/14/2022 01:00 PM    HCT 39.8 07/14/2022 01:00 PM    MCV 88.4 07/14/2022 01:00 PM    RDW 12.1 07/14/2022 01:00 PM     07/14/2022 01:00 PM       CMP:   Lab Results   Component Value Date/Time     07/14/2022 01:00 PM    K 3.8 07/14/2022 01:00 PM     07/14/2022 01:00 PM    CO2 26 07/14/2022 01:00 PM    BUN 16 07/14/2022 01:00 PM    CREATININE 0.86 07/14/2022 01:00 PM    GFRAA >60 07/14/2022 01:00 PM    LABGLOM >60 07/14/2022 01:00 PM    GLUCOSE 304 07/14/2022 01:00 PM    CALCIUM 9.1 07/14/2022 01:00 PM       POC Tests:   Recent Labs     07/21/22  0554   POCGLU 148*       Coags:   Lab Results   Component Value Date/Time    PROTIME 12.9 07/14/2022 01:00 PM    INR 0.9 07/14/2022 01:00 PM    APTT 27.1 07/14/2022 01:00 PM       HCG (If Applicable): No results found for: PREGTESTUR, PREGSERUM, HCG, HCGQUANT     ABGs: No results found for: PHART, PO2ART, RRE5VPW, NGD8FTJ, BEART, S2AHQFLY     Type & Screen (If Applicable):  No results found for: LABABO, LABRH    Drug/Infectious Status (If Applicable):  No results found for: HIV, HEPCAB    COVID-19 Screening (If Applicable): No results found for: COVID19        Anesthesia Evaluation  Patient summary reviewed and Nursing notes reviewed  Airway: Mallampati: II  TM distance: >3 FB   Neck ROM: full     Dental:    (+) upper dentures      Pulmonary:Negative Pulmonary ROS breath sounds clear to auscultation                             Cardiovascular:  Exercise tolerance: good (>4 METS),   (+) hypertension:, CAD:, CABG/stent (x 3; will give bASA this am):,         Rhythm: regular  Rate: normal                 ROS comment: 6/21 EF 45-50%     Neuro/Psych:   Negative Neuro/Psych ROS               ROS comment: Previous neck fusion with limited mobility/extension GI/Hepatic/Renal: Neg GI/Hepatic/Renal ROS            Endo/Other:    (+) DiabetesType II DM, , hypothyroidism: arthritis:., .                  ROS comment:  Abdominal:             Vascular: negative vascular ROS. Other Findings:           Anesthesia Plan      general     ASA 3       Induction: intravenous. Anesthetic plan and risks discussed with patient.                         Vibha Neely MD   7/21/2022

## 2022-07-21 NOTE — ANESTHESIA PROCEDURE NOTES
Airway  Date/Time: 7/21/2022 7:47 AM  Urgency: elective    Airway not difficult    General Information and Staff    Patient location during procedure: OR  Resident/CRNA: BRITANY Bob - CRNA  Performed: resident/CRNA     Indications and Patient Condition  Indications for airway management: anesthesia  Spontaneous Ventilation: absent  Sedation level: deep  Preoxygenated: yes  Patient position: sniffing  MILS not maintained throughout  Mask difficulty assessment: vent by bag mask + OA or adjuvant +/- NMBA    Final Airway Details  Final airway type: endotracheal airway      Successful airway: ETT  Cuffed: yes   Successful intubation technique: video laryngoscopy (Elective)  Facilitating devices/methods: intubating stylet  Endotracheal tube insertion site: oral  Blade: Karolyn  Blade size: #4  ETT size (mm): 8.0  Cormack-Lehane Classification: grade I - full view of glottis  Placement verified by: chest auscultation and capnometry   Measured from: lips  ETT to lips (cm): 24  Number of attempts at approach: 1  Ventilation between attempts: bag mask

## 2022-07-21 NOTE — ANESTHESIA POSTPROCEDURE EVALUATION
Department of Anesthesiology  Postprocedure Note    Patient: Bill Kramer  MRN: [id number]  YOB: 1955  Date of evaluation: 7/21/2022      Procedure Summary     Date: 07/21/22 Room / Location: [facility] MAIN OR 11 / D MAIN OR    Anesthesia Start: 5278 Anesthesia Stop: 0239    Procedure: PROSTATECTOMY LAPAROSCOPIC ROBOTIC/ bilateral lymph node dissection (Abdomen) Diagnosis:       Malignant neoplasm of prostate ([address]      (Malignant neoplasm of prostate ([address] Cyn Friend)    Providers: Heavenly Echols DO Responsible Provider: Sade Quinones MD    Anesthesia Type: general ASA Status: 3          Anesthesia Type: No value filed.     Chidi Phase I: Chidi Score: 10    Chidi Phase II:        Anesthesia Post Evaluation    Patient location during evaluation: PACU  Patient participation: complete - patient participated  Level of consciousness: awake and alert  Airway patency: patent  Nausea & Vomiting: no nausea  Cardiovascular status: hemodynamically stable  Respiratory status: acceptable  Hydration status: euvolemic

## 2022-07-21 NOTE — BRIEF OP NOTE
Brief Postoperative Note      Patient: Devon Vera  YOB: 1955  MRN: [id number]    Date of Procedure: 7/21/2022    Pre-Op Diagnosis: Malignant neoplasm of prostate ([address] Edita Robertsonlori    Post-Op Diagnosis: Same       Procedure: RALP and bilateral PLND    Surgeon(s):  Ronnie Grossman DO    Assistant:  * No surgical staff found *    Anesthesia: General    Estimated Blood Loss (mL): 51GK    Complications: none immediate    Specimens:   ID Type Source Tests Collected by Time Destination   A : Prostate Tissue Prostate SURGICAL PATHOLOGY [facility],  7/21/2022 0943    B : Left pelvic lymph node Tissue Lymph Node [facility],  7/21/2022 0934    C : Right pelvic lymph node Tissue Lymph Node [facility]OLOG[facility],  7/21/2022 6112        Implants:  * No implants in log *      Drains:   Closed/Suction Drain Abdomen Bulb (Active)       Urinary Catheter 2 Way (Active)       Findings: see op note    Electronically signed by Sindy Ulloa DO on 7/21/2022 at 9:59 AM

## 2022-07-21 NOTE — OP NOTE
[facility]  OPERATIVE REPORT    Name:  Everette White  MR#:  [id number]  :  1955  ACCOUNT #:  [de-identified]  DATE OF SERVICE:  2022    PREOPERATIVE DIAGNOSIS:  Prostate cancer. POSTOPERATIVE DIAGNOSIS:  Prostate cancer. PROCEDURE PERFORMED:  Robotic-assisted laparoscopic radical prostatectomy and bilateral pelvic lymph node dissection. SURGEON:  Demian Marti DO    ASSISTANT:  None. ANESTHESIA:  General.    COMPLICATIONS:  None immediate. SPECIMENS REMOVED:  Prostate and bilateral pelvic lymph nodes. IMPLANTS:  None. ESTIMATED BLOOD LOSS:  50 mL    CLINICAL HISTORY:  This is a 64-year-old gentleman who was recently diagnosed with Albert 6 and 7 adenocarcinoma of the prostate on 2022. His pre-biopsy PSA was 3.6 and clinical stage is T2a. All risks, benefits and alternatives to the above-mentioned procedure have been reviewed and he is willing to proceed at this time. PROCEDURE:  The patient consent was obtained. The patient was brought back to the operating room, at which time he was placed in the supine position. After the uneventful induction of general anesthesia, he was then placed in a low lithotomy position and his genital and abdominal areas were prepped and draped and a sterile field applied. An 18-Moroccan Irwin catheter was placed to dependent drainage. A small periumbilical incision was made and the Veress needle was inserted into the abdominal cavity without event. The abdomen was then insufflated with CO2. Ports were then placed in a standard robotic prostatectomy fashion under direct vision. The patient did have some right lower quadrant omental adhesions. These were taken down sharply using the laparoscopic scissors. The patient was then placed in a steep Trendelenburg position and the robot was docked. The space of Retzius was entered by dropping the bladder posteriorly. The endopelvic fascia was incised bilaterally.   The dorsal venous complex was dissected out and oversewn using 2-0 Vicryl in a figure-of-eight fashion. A back stitch was also placed near the bladder neck. The bladder neck was then transected from the prostate using electrocautery. The catheter was identified in the midline and retracted anteriorly to aid in posterior dissection. Following division of the posterior bladder neck, dissection was then carried posteriorly to the seminal vesicles and vas deferens bilaterally. These structures were dissected out and divided. A bilateral retrograde nerve-sparing procedure was then performed using athermal technique. Pedicles were controlled using Lapro-Clips once again utilizing an athermal technique. The neurovascular bundles in the rectum were then swept off the prostate bluntly in an antegrade fashion. The dorsal vein was divided using electrocautery and the urethra was transected using the robotic scissors. The prostate was then placed within the EndoCatch bag and placed within the lower pelvis. The urethrovesical anastomosis was accomplished using 3-0 double-arm Monocryl in a running fashion. A 20-Kiswahili Irwin catheter was placed at the completion of this anastomosis. The bladder was thoroughly irrigated. No obvious leak was seen. The bladder was then tacked anteriorly to the endopelvic fascia bilaterally using 2-0 Vicryl in a simple interrupted fashion. A bilateral pelvic lymph node dissection was then performed. Boundaries of my dissection included the inguinal ligament inferiorly, bifurcation of the common iliac artery superiorly, pelvic side wall laterally, and bladder medially. All jaime tissue within this plane was removed. Care was taken to avoid injury to all vascular structures as well as the obturator nerve. Following extraction of the jaime tissue, the intraabdominal pressures were lowered. No active bleeding was seen. No injury was seen in the lower pelvis.   The intraabdominal pressures were raised and the robot was undocked. The prostate was removed through the periumbilical incision and a 15-LEO drain was placed through the 5-mm port site and sewn in place using 2-0 nylon suture. The midline fascia was closed using 0 Vicryl in a running fashion. Skin was closed here using 4-0 Monocryl in a running subcuticular fashion. All other port sites were closed using Dermabond. The patient tolerated the procedure well. Estimated blood loss was 50 mL.       [address] [facility], DO      [id number]  D:  07/21/2022 10:54  T:  07/21/2022 12:44  JOB #:  [id number]

## 2022-07-22 VITALS
HEART RATE: 92 BPM | SYSTOLIC BLOOD PRESSURE: 130 MMHG | TEMPERATURE: 98.2 F | OXYGEN SATURATION: 96 % | HEIGHT: 69 IN | WEIGHT: 184.4 LBS | RESPIRATION RATE: 16 BRPM | BODY MASS INDEX: 27.31 KG/M2 | DIASTOLIC BLOOD PRESSURE: 74 MMHG

## 2022-07-22 LAB
ANION GAP SERPL CALC-SCNC: 7 MMOL/L (ref 7–16)
BUN SERPL-MCNC: 12 MG/DL (ref 8–23)
CALCIUM SERPL-MCNC: 8.5 MG/DL (ref 8.3–10.4)
CHLORIDE SERPL-SCNC: 108 MMOL/L (ref 98–107)
CO2 SERPL-SCNC: 25 MMOL/L (ref 21–32)
CREAT SERPL-MCNC: 0.8 MG/DL (ref 0.8–1.5)
GLUCOSE BLD STRIP.AUTO-MCNC: 159 MG/DL (ref 65–100)
GLUCOSE BLD STRIP.AUTO-MCNC: 163 MG/DL (ref 65–100)
GLUCOSE SERPL-MCNC: 170 MG/DL (ref 65–100)
HCT VFR BLD AUTO: 41.2 % (ref 41.1–50.3)
HGB BLD-MCNC: 13.6 G/DL (ref 13.6–17.2)
POTASSIUM SERPL-SCNC: 3.9 MMOL/L (ref 3.5–5.1)
SERVICE CMNT-IMP: ABNORMAL
SERVICE CMNT-IMP: ABNORMAL
SODIUM SERPL-SCNC: 140 MMOL/L (ref 136–145)

## 2022-07-22 PROCEDURE — 36415 COLL VENOUS BLD VENIPUNCTURE: CPT

## 2022-07-22 PROCEDURE — 2580000003 HC RX 258: Performed by: UROLOGY

## 2022-07-22 PROCEDURE — 6360000002 HC RX W HCPCS: Performed by: UROLOGY

## 2022-07-22 PROCEDURE — 85018 HEMOGLOBIN: CPT

## 2022-07-22 PROCEDURE — 99024 POSTOP FOLLOW-UP VISIT: CPT | Performed by: UROLOGY

## 2022-07-22 PROCEDURE — 6370000000 HC RX 637 (ALT 250 FOR IP): Performed by: UROLOGY

## 2022-07-22 PROCEDURE — 82962 GLUCOSE BLOOD TEST: CPT

## 2022-07-22 PROCEDURE — 80048 BASIC METABOLIC PNL TOTAL CA: CPT

## 2022-07-22 RX ORDER — SULFAMETHOXAZOLE AND TRIMETHOPRIM 800; 160 MG/1; MG/1
1 TABLET ORAL 2 TIMES DAILY
Qty: 14 TABLET | Refills: 0 | Status: SHIPPED | OUTPATIENT
Start: 2022-07-22 | End: 2022-07-29

## 2022-07-22 RX ORDER — PSEUDOEPHEDRINE HCL 30 MG
100 TABLET ORAL 2 TIMES DAILY
Qty: 60 CAPSULE | Refills: 2 | Status: SHIPPED | OUTPATIENT
Start: 2022-07-22

## 2022-07-22 RX ORDER — OXYCODONE HYDROCHLORIDE AND ACETAMINOPHEN 5; 325 MG/1; MG/1
1 TABLET ORAL EVERY 6 HOURS PRN
Qty: 20 TABLET | Refills: 0 | Status: SHIPPED | OUTPATIENT
Start: 2022-07-22 | End: 2022-07-27

## 2022-07-22 RX ADMIN — LOSARTAN POTASSIUM 50 MG: 50 TABLET, FILM COATED ORAL at 09:09

## 2022-07-22 RX ADMIN — MORPHINE SULFATE 2 MG: 2 INJECTION, SOLUTION INTRAMUSCULAR; INTRAVENOUS at 01:09

## 2022-07-22 RX ADMIN — CEFAZOLIN 1000 MG: 1 INJECTION, POWDER, FOR SOLUTION INTRAMUSCULAR; INTRAVENOUS at 09:08

## 2022-07-22 RX ADMIN — OXYBUTYNIN CHLORIDE 5 MG: 5 TABLET ORAL at 02:36

## 2022-07-22 RX ADMIN — ROSUVASTATIN CALCIUM 5 MG: 5 TABLET, COATED ORAL at 09:09

## 2022-07-22 RX ADMIN — INSULIN HUMAN 4 UNITS: 100 INJECTION, SOLUTION PARENTERAL at 12:31

## 2022-07-22 RX ADMIN — DOCUSATE SODIUM 100 MG: 100 CAPSULE, LIQUID FILLED ORAL at 09:08

## 2022-07-22 RX ADMIN — OXYCODONE 10 MG: 5 TABLET ORAL at 05:00

## 2022-07-22 RX ADMIN — OXYCODONE 10 MG: 5 TABLET ORAL at 09:11

## 2022-07-22 RX ADMIN — METFORMIN HYDROCHLORIDE 500 MG: 500 TABLET ORAL at 09:09

## 2022-07-22 RX ADMIN — LEVOTHYROXINE SODIUM 100 MCG: 0.05 TABLET ORAL at 06:05

## 2022-07-22 RX ADMIN — INSULIN HUMAN 4 UNITS: 100 INJECTION, SOLUTION PARENTERAL at 09:07

## 2022-07-22 ASSESSMENT — PAIN DESCRIPTION - ORIENTATION
ORIENTATION: RIGHT;LEFT
ORIENTATION: MID;ANTERIOR

## 2022-07-22 ASSESSMENT — PAIN SCALES - GENERAL
PAINLEVEL_OUTOF10: 9
PAINLEVEL_OUTOF10: 6
PAINLEVEL_OUTOF10: 6

## 2022-07-22 ASSESSMENT — PAIN DESCRIPTION - LOCATION
LOCATION: BUTTOCKS
LOCATION: ABDOMEN

## 2022-07-22 ASSESSMENT — PAIN DESCRIPTION - DESCRIPTORS
DESCRIPTORS: SORE
DESCRIPTORS: ACHING
DESCRIPTORS: ACHING

## 2022-07-22 NOTE — DISCHARGE INSTRUCTIONS
Radical Retropubic Prostatectomy: What to Expect at Home  Your Recovery  A radical retropubic prostatectomy is surgery to remove the prostate gland. It is usually done to treat prostate cancer that hasn't spread beyond the prostate. The doctor did the surgery through a 3- to 4-inch cut, called anincision, in your lower belly between the navel and the pubic bone. You may see some bruising and swelling right after your surgery. In the week after surgery, your penis and scrotum may swell. This usually gets better after 1 to 2 weeks. The incision may be sore for 1 to 2 weeks. Your doctor will giveyou medicine for pain. You will have a tube (urinary catheter) to drain urine from your bladder for the first 1 to 2 weeks after surgery. You may have bladder cramps, or spasms, while the catheter is in your bladder. Your doctor can give you medicine tohelp prevent bladder spasms. After your catheter is removed, it may take several weeks or more for you to control your urine. And it may take 6 months or more for you to be able to have erections again. But with time, most people regain urine control and much oftheir previous sexual function. If not, medicines or other treatments may help. You will probably be able to go back to work or your usual activities 3 to 5weeks after surgery. But it can take longer to fully recover. You will need to see your doctor regularly. This includes having blood tests to measure your PSA level. PSA is a substance that can suggest whether your cancer has returned. PSA tests are usually done more often for the first several yearsafter your surgery, but less often after that. This care sheet gives you a general idea about how long it will take for you to recover. But each person recovers at a different pace. Follow the steps belowto get better as quickly as possible. How can you care for yourself at home? Activity    Rest when you feel tired. Getting enough sleep will help you recover. and when you can restart your medicines. The doctor will also give you instructions about taking any new medicines. If you stopped taking aspirin or some other blood thinner, your doctor will tell you when to start taking it again. Take pain medicines exactly as directed. If the doctor gave you a prescription medicine for pain, take it as prescribed. If you are not taking a prescription pain medicine, ask your doctor if you can take an over-the-counter medicine. If you think your pain medicine is making you sick to your stomach: Take your medicine after meals (unless your doctor has told you not to). Ask your doctor for a different pain medicine. Your doctor may prescribe antibiotics when your urinary catheter is removed. Take them as directed. Do not stop taking them just because you feel better. You need to take the full course of antibiotics. Incision care    If you have strips of tape on the incision, leave the tape on for a week or until it falls off. If you had stitches, your doctor will tell you when to come back to have them removed. Wash the area daily with warm water and pat it dry. Don't use hydrogen peroxide or alcohol, which can slow healing. You may cover the area with a gauze bandage if it weeps or rubs against clothing. Change the bandage every day. Keep the area clean and dry. Ice and elevation    To help with pain and swelling, put ice or a cold pack on your groin for 10 to 20 minutes at a time. Put a thin cloth between the ice and your skin. Other instructions    You will have a urinary catheter for 1 to 2 weeks. Your doctor or nurse will tell you how to care for it. Be sure the catheter is securely taped to your thigh and attached to the large drainage bag when you are at home. Use the smaller leg bag only when you go out. A little urine leakage around the catheter is normal. You can place an incontinence pad in your underwear to absorb urine leaks. Do not have an enema or use a rectal thermometer for 3 months, or until your doctor says it is okay. Follow-up care is a key part of your treatment and safety. Be sure to make and go to all appointments, and call your doctor if you are having problems. It's also a good idea to know your test results and keep alist of the medicines you take. When should you call for help? Call 911 anytime you think you may need emergency care. For example, call if:    You passed out (lost consciousness). You have chest pain, are short of breath, or cough up blood. Call your doctor now or seek immediate medical care if:    You have pain that does not get better after you take pain medicine. You have loose stitches, or your incision comes open. You have signs of infection, such as: Increased pain, swelling, warmth, or redness. Red streaks leading from the area. Pus draining from the area. A fever. You are unable to urinate. You are bleeding from your incision. You are sick to your stomach or cannot drink fluids. You have symptoms of a urinary tract infection. This may include:  Pain or burning when you urinate. A frequent need to urinate without being able to pass much urine. Pain in the flank, which is just below the rib cage and above the waist on either side of the back. Blood in your urine. A fever. You have signs of a blood clot in your leg (called a deep vein thrombosis), such as:  Pain in your calf, back of the knee, thigh, or groin. Redness or swelling in your leg. Watch closely for changes in your health, and be sure to contact your doctor ifyou have any problems. Where can you learn more? Go to https://[employer]plan[employer]ole.LiveAction. org and sign in to your [employer] account. Enter G135 in the [facility] box to learn more about \"Radical Retropubic Prostatectomy: What to Expect at Home. \"     If you do not have an account, please click on the \"Sign Up Now\" link.  Current as of: September 8, 2021               Content Version: 13.3  © 2146-3633 [employer], Incorporated. Care instructions adapted under license by [facility] ([facility]). If you have questions about a medical condition or this instruction, always ask your healthcare professional. [address] any warranty or liability for your use of this information.

## 2022-07-22 NOTE — PROGRESS NOTES
Discharge instructions given. Education provided. All questions answered and verbally voiced understanding. Medication changes and follow up appointments discussed. AVS reviewed, signed, and placed in chart. Copy provided for pt. Wife here to take patient home at this time. Irwin catheter care demonstrated to patient and wife. Both demonstrated back. All questions answered. Gauze dressing to Jose Carlos site. C/d/I. Derma jauregui to lap sites intact. Irwin draining anshu clear urine at this time.

## 2022-07-22 NOTE — PROGRESS NOTES
Admit Date: 7/21/2022      Subjective:     Neema Jessica is POD 1  PROSTATECTOMY LAPAROSCOPIC ROBOTIC/ bilateral lymph node dissection. Sitting in the chair. No complaints. He has passed flatus and has ambulated. Marjorie Kappa to go home.      Objective:     Patient Vitals for the past 8 hrs:   BP Temp Temp src Pulse Resp SpO2   07/22/22 0729 137/73 98.1 °F (36.7 °C) Oral 94 16 98 %   07/22/22 0335 138/68 98.4 °F (36.9 °C) Oral 93 18 97 %     07/22 0701 - 07/22 1900  In: 540 [P.O.:540]  Out: 720 [Urine:700; Drains:20]  07/20 1901 - 07/22 0700  In: 1100 [I.V.:1100]  Out: 5505 [Urine:5400; Drains:5]    Physical Exam:  GENERAL ASSESSMENT: alert, oriented to person, place and time, no acute distress Chest: clear to auscultation  CVS exam: normal rate, regular rhythm, normal S1, S2  ABDOMEN: soft, non tender, port sites cdi, randolph drain  Neurological exam reveals alert, oriented, normal speech        Data Review   Recent Results (from the past 24 hour(s))   POCT Glucose    Collection Time: 07/21/22 10:54 AM   Result Value Ref Range    POC Glucose 179 (H) 65 - 100 mg/dL    Performed by: Merlyn Hernandez    POCT Glucose    Collection Time: 07/21/22  2:59 PM   Result Value Ref Range    POC Glucose 179 (H) 65 - 100 mg/dL    Performed by: Amber    POCT Glucose    Collection Time: 07/21/22  5:53 PM   Result Value Ref Range    POC Glucose 180 (H) 65 - 100 mg/dL    Performed by: Hailee Ibrahim    POCT Glucose    Collection Time: 07/21/22  9:17 PM   Result Value Ref Range    POC Glucose 173 (H) 65 - 100 mg/dL    Performed by: [facility]    Basic Metabolic Panel    Collection Time: 07/22/22  6:45 AM   Result Value Ref Range    Sodium 140 136 - 145 mmol/L    Potassium 3.9 3.5 - 5.1 mmol/L    Chloride 108 (H) 98 - 107 mmol/L    CO2 25 21 - 32 mmol/L    Anion Gap 7 7 - 16 mmol/L    Glucose 170 (H) 65 - 100 mg/dL    BUN 12 8 - 23 MG/DL    Creatinine 0.80 0.8 - 1.5 MG/DL    GFR  >60 >60 ml/min/1.73m2    GFR Non-African American >60 >60 ml/min/1.73m2    Calcium 8.5 8.3 - 10.4 MG/DL   Hemoglobin and Hematocrit    Collection Time: 07/22/22  6:45 AM   Result Value Ref Range    Hemoglobin 13.6 13.6 - 17.2 g/dL    Hematocrit 41.2 41.1 - 50.3 %   POCT Glucose    Collection Time: 07/22/22  7:07 AM   Result Value Ref Range    POC Glucose 159 (H) 65 - 100 mg/dL    Performed by: Alexandra Mistry        Assessment:     Principal Problem:    Malignant neoplasm of prostate ([facility] [address]  Active Problems:    Prostate cancer ([address]  Resolved Problems:    * No resolved hospital problems. *        Pre-Op Diagnosis: Malignant neoplasm of prostate ([facility] [address] [C61]      Procedures: Procedure(s):  PROSTATECTOMY LAPAROSCOPIC ROBOTIC/ bilateral lymph node dissection      Plan:     Remove LEO drain. Continue parekh catheter (parekh will stay in place 7-10 days). Do not manipulate parekh (do not irrigate/remove from stat lock). Start parekh catheter care teaching. Okay to switch to leg bag while teaching. Call with any questions/concerns. Continue to ambulate. Advance diet to regular. Encourage incentive spirometer use. Decrease IVF to 75 ml/hr. Home later today if he continues to progress      Signed By: BRITANY Mccarthy - OSCAR     July 22, 2022      [facility]    I have reviewed the above note and examined the patient. I agree with the exam, assessment and plan. Remove drain. Advance diet. Home later with Parekh if progressing.     Mari Marti,

## 2022-07-22 NOTE — CARE COORDINATION
Mr. oLyd Prajapati is s/p robotic-assisted laparoscopic radical prostatectomy for prostate cancer. From review of chart, no additional discharge needs identified. Case Management available as needed.       07/22/22 5255   Service Assessment   Support Systems Spouse/Significant Other   PCP Verified by CM Yes   Condition of Participation: Discharge Planning   The Plan for Transition of Care is related to the following treatment goals: home when stable

## 2022-07-28 ENCOUNTER — OFFICE VISIT (OUTPATIENT)
Dept: UROLOGY | Age: 67
End: 2022-07-28

## 2022-07-28 DIAGNOSIS — C61 PROSTATE CANCER (HCC): Primary | ICD-10-CM

## 2022-07-28 PROCEDURE — 99024 POSTOP FOLLOW-UP VISIT: CPT | Performed by: UROLOGY

## 2022-07-28 RX ORDER — METFORMIN HYDROCHLORIDE 500 MG/1
TABLET, EXTENDED RELEASE ORAL
COMMUNITY
Start: 2022-07-18

## 2022-07-28 RX ORDER — TAMSULOSIN HYDROCHLORIDE 0.4 MG/1
CAPSULE ORAL
COMMUNITY
Start: 2022-07-25

## 2022-07-28 RX ORDER — TADALAFIL 20 MG/1
20 TABLET ORAL DAILY PRN
Qty: 10 TABLET | Refills: 5 | Status: SHIPPED | OUTPATIENT
Start: 2022-07-28

## 2022-07-28 NOTE — PROGRESS NOTES
[facility]  [place], [address]  [phone]    Silvio Gallardo  : 1955     HPI   78 y.o., male returns in follow up for CaP. S/P RALP on 22. Path showed [place] 4+3, T2N0 with a focal positive R margin. He has done well post op. Past Medical History:   Diagnosis Date    Arthritis     Benign hypertensive heart disease without heart failure 2016    Chronic pain     chronic back pain    Coronary atherosclerosis of native coronary vessel 2016    Stress test 2021 EF 69%    Diabetes ([address]     Type 2 po and insulin av -150 can Hypo below 80    DM type 2 (diabetes mellitus, type 2) ([address] 2016    Dyspnea     Hypertension     managed with meds    Malignant neoplasm of prostate ([address] 2022    Thyroid disease     hypo- managed with med     Past Surgical History:   Procedure Laterality Date    CHOLECYSTECTOMY      NEUROLOGICAL SURGERY  2017    C5 Fusion    CT ABDOMEN SURGERY PROC UNLISTED      GALLBLADDER    CT CARDIAC SURG PROCEDURE UNLIST      stents in . (a total of 3 heart stents last one placed ) sees [facility]    PROSTATECTOMY N/A 2022    PROSTATECTOMY LAPAROSCOPIC ROBOTIC/ bilateral lymph node dissection performed by Hayden Green DO at [facility] MAIN OR     Current Outpatient Medications   Medication Sig Dispense Refill    tadalafil (CIALIS) 20 MG tablet Take 1 tablet by mouth daily as needed for Erectile Dysfunction 10 tablet 5    docusate sodium (COLACE, DULCOLAX) 100 MG CAPS Take 100 mg by mouth in the morning and 100 mg before bedtime. 60 capsule 2    sulfamethoxazole-trimethoprim (BACTRIM DS;SEPTRA DS) 800-160 MG per tablet Take 1 tablet by mouth in the morning and 1 tablet before bedtime. Do all this for 7 days.  14 tablet 0    losartan (COZAAR) 50 mg tablet TAKE ONE TABLET BY MOUTH ONE TIME DAILY 90 tablet 3    Aspirin 325 MG CAPS Take 81 mg by mouth every evening       insulin 70-30 (HUMULIN;NOVOLIN) (70-30) 100 UNIT per ML injection vial Inject 35 Units into the skin nightly       levothyroxine (SYNTHROID) 100 MCG tablet Take 100 mcg by mouth every morning (before breakfast)      loratadine (CLARITIN) 10 MG tablet Take 10 mg by mouth daily as needed      LORazepam (ATIVAN) 1 MG tablet Take by mouth every 4 hours as needed. metFORMIN (GLUCOPHAGE) 500 MG tablet Take 500 mg by mouth 2 times daily (with meals)      oxyCODONE HCl (OXY-IR) 10 MG immediate release tablet Take 10 mg by mouth 4 times daily as needed. rosuvastatin (CRESTOR) 5 MG tablet Take 5 mg by mouth daily       tamsulosin (FLOMAX) 0.4 MG capsule       metFORMIN (GLUCOPHAGE-XR) 500 MG extended release tablet        No current facility-administered medications for this visit. Allergies   Allergen Reactions    Ticagrelor Shortness Of Breath     Ill-tempered    Clopidogrel Rash     Social History     Socioeconomic History    Marital status:      Spouse name: Not on file    Number of children: Not on file    Years of education: Not on file    Highest education level: Not on file   Occupational History    Not on file   Tobacco Use    Smoking status: Never    Smokeless tobacco: Never   Substance and Sexual Activity    Alcohol use: No    Drug use: No    Sexual activity: Not on file   Other Topics Concern    Not on file   Social History Narrative    Not on file     Social Determinants of Health     Financial Resource Strain: Not on file   Food Insecurity: Not on file   Transportation Needs: Not on file   Physical Activity: Not on file   Stress: Not on file   Social Connections: Not on file   Intimate Partner Violence: Not on file   Housing Stability: Not on file     Family History   Problem Relation Age of Onset    Heart Attack Father     Heart Attack Mother     Coronary Art Dis Other         FAMILY HX       Review of Systems  All systems reviewed and are negative at this time.     Physical Exam  There were no vitals taken for this visit. General appearance - alert, well appearing, and in no distress  Mental status - alert, oriented to person, place, and time  Eyes - extraocular eye movements intact, sclera anicteric  Abdomen - soft, nontender, nondistended, no masses or organomegaly. Ports healing well. Neurological -  normal speech, no focal findings or movement disorder noted  Skin - normal coloration and turgor      Assessment/Plan    ICD-10-CM    1. Prostate cancer ([facility] [address]  C61 PSA, ultrasensitive        [place] removed. Path reviewed. RTO in 3 mo with PSA prior. Pt started on Cialis for rehab.     DELFINO TIRADO, DO

## 2022-08-11 ENCOUNTER — HOSPITAL ENCOUNTER (OUTPATIENT)
Dept: PHYSICAL THERAPY | Age: 67
Setting detail: RECURRING SERIES
Discharge: HOME | End: 2022-08-14
Payer: MEDICARE

## 2022-08-11 PROCEDURE — 97110 THERAPEUTIC EXERCISES: CPT

## 2022-08-11 ASSESSMENT — PAIN SCALES - GENERAL: PAINLEVEL_OUTOF10: 4

## 2022-08-11 NOTE — PROGRESS NOTES
Aba Floresx  : 1955  Primary: [employer] Siegel Medicare Advantage o  Secondary:  [facility]  [address] [address]  Phone: [phone]  Fax: [phone] Plan Frequency: 1x/week for 90 days  Plan of Care/Certification Expiration Date: 22      PT Visit Info: Total # of Visits to Date: 2      OUTPATIENT PHYSICAL THERAPY:OP NOTE TYPE: Treatment Note 2022       Episode  }Appt Desk              Treatment Diagnosis:  Lack of coordination (muscle incoordination) (R27.8)  Low back pain (M54.5 )  Malignant neoplasm of prostate (C61)  Medical/Referring Diagnosis:  Malignant neoplasm of prostate [C61]  Referring Physician:  Pradeep Gregorio DO MD Orders:  PT Eval and Treat prostatectomy pelvic floor therapy  Date of Onset:  Onset Date: 22     Allergies:   Ticagrelor and Clopidogrel  Restrictions/Precautions:  Restrictions/Precautions: None  No data recorded   Interventions Planned (Treatment may consist of any combination of the following):    Current Treatment Recommendations: Strengthening; Manual Therapy - Soft Tissue Mobilization; Home exercise program; Patient/Caregiver education & training; Therapeutic activities     Subjective Comments: The patient reports he had no idea he would leak this much. He reports he feels impatient  Initial:}    4/10Post Session:       4/10  Medications Last Reviewed:  2022  Updated Objective Findings:  See evaluation note from today  Treatment   THERAPEUTIC EXERCISE: ( 39 minutes):    Exercises per grid below to improve strength and coordination. Required moderate verbal and tactile cues to promote proper body breathing techniques. Progressed repetitions as indicated.   TTHERAPEUTIC ACTIVITY: ( minutes): Functional activity education regarding anatomy, pathology and role of pelvic floor muscle (PFM) function in relation to presenting symptoms and role of pelvic floor therapy in conservative treatment., Functional activity education on avoiding bladder irritants, substitutions for common irritants, recommended fluid intake (types and spacing), appropriate voiding frequency, weight management and overall contributing factors of bowel health on bladder health/function in order to improve independent self management. Patient was provided a list of common bladder irritants including caffeine, carbonation, alcohol, artificial sweeteners, chocolate, acidic drinks, and tomato based drinks. , and Instruction on coordinated pelvic floor and diaphragmatic breathing to improve kinesthetic awareness of pelvic muscle mobility and restore proper motor recruitment patterns with breathing, posture, and functional movement (e.g. appropriate lift/contraction with increased IAP such as a cough, laugh, sneeze to prevent incontinence as well as appropriate relaxation/drop to reduce pain with vaginal penetration). TA Educational Topic Notes Date Completed   Pathology/Anatomy/PFM Function  7/18/22   Bladder health education     Urinary urge suppression     The knack     Voiding strategies     Nocturia tips     Bowel/Bladder log     Bowel health education     Constipation care     Diarrhea/Fecal leakage     Colonic massage     Toilet positioning     Defecation dynamics     Sources of fiber     Return to intercourse/Dilator training     Sexual positioning     Lubricants/vaginal moisturizers     Vaginal irritants     Body mechanics     Posture/ergonomics     Diaphragmatic breathing  7/18/22   Resources and technology           No grid  The patient was instructed in correct Kegel's again. He was instructed to place hands on his abdomen in order to adjust abdominal activity. He was advised to increase his water intake. He has drastically reduce it. He was advised to reduce drinking soda. He was advised to read the Cialis prescription and make sure the dosage. He was encouraged to continue with these suggestions in order to reduce leakage.   The patient Is in agreement. He will increase walking to 30 minutes daily. Treatment/Session Summary:    Treatment Assessment:  The patient has significant leakage. He is drinking bladder irritants, limiting fluids which concentrates urine. both of these contribute to leakage. He was unsure of his dose of Cialis. He will need to read the bottle. Communication/Consultation:   avoid bladder irritants, practice Kegel's, acquire incontinence supplies  Equipment provided today:  None  Recommendations/Intent for next treatment session: Next visit will focus on reassessment post surgery.     Total Treatment Billable Duration:  39 minutes     Time In: 1101  Time Out: [address] [facility] MARJORIE, PT       Charge Capture  }Post Session Pain  PT Visit Info  [employer] Portal  MD Guidelines  Scanned Media  Benefits  MyChart    Future Appointments   Date Time Provider Unique Campo   9/1/2022 11:00 AM Parish Arriaza, PT [facility]   11/21/2022 11:00 AM [id number] BLOOD DRAW [id number] [facility]   11/28/2022  1:30 PM Amish Marti DO [id number] [facility] AMB   1/19/2023  8:15 AM MD PADDY Domingo [facility]

## 2022-08-11 NOTE — THERAPY RECERTIFICATION
Quin Wilkerson  : 1955  Primary: [facility] Medicare Advantage [employer]  Secondary:  [facility]  [address]  [address] [address]  Phone: [phone]  Fax: [phone] Plan Frequency: 1x/week for 90 days  Plan of Care/Certification Expiration Date: 22      PT Visit Info: Total # of Visits to Date: 2      OUTPATIENT PHYSICAL THERAPY:OP NOTE TYPE: Recertification                Episode  Appt Desk         Treatment Diagnosis:  Lack of coordination (muscle incoordination) (R27.8)  Hesitancy of micturition (R39.11)  Low back pain (M54.5 )  Malignant neoplasm of prostate (C61)  * No diagnoses found *  Medical/Referring Diagnosis:  Malignant neoplasm of prostate [C61]  Referring Physician:  Darion Zazueta DO MD Orders:  PT Eval and Treat pre-prostatectomy pelvic floor therapy  Return MD Appt:    Date of Onset:  Onset Date: 22     Allergies:  Ticagrelor and Clopidogrel  Restrictions/Precautions:    Restrictions/Precautions: None  No data recorded   Medications Last Reviewed:  2022     SUBJECTIVE   History of Injury/Illness (Reason for Referral): The patient was diagnosed with prostate cancer 5/3/22. He  underwent a RALP 22. Patient Stated Goal(s):  \"help me stop leaking\"  Initial:     4/10 Post Session:     4/10  Past Medical History/Comorbidities:   Mr. Rosendo Rust  has a past medical history of Arthritis, Benign hypertensive heart disease without heart failure, Chronic pain, Coronary atherosclerosis of native coronary vessel, Diabetes ([address] DM type 2 (diabetes mellitus, type 2) ([address] Dyspnea, Hypertension, Malignant neoplasm of prostate ([address] and Thyroid disease. Mr. Rosendo Rust  has a past surgical history that includes pr cardiac surg procedure unlist; neurological surgery (2017); pr abdomen surgery proc unlisted; Cholecystectomy; and Prostatectomy (N/A, 2022).   Past Medical History:   Diagnosis Date    Arthritis     Benign hypertensive heart disease without heart failure 1/8/2016    Chronic pain     chronic back pain    Coronary atherosclerosis of native coronary vessel 1/8/2016    Stress test 06/23/2021 EF 69%    Diabetes (HCC)     Type 2 po and insulin av -150 can Hypo below 80    DM type 2 (diabetes mellitus, type 2) ([facility] [address] 1/8/2016    Dyspnea     Hypertension     managed with meds    Malignant neoplasm of prostate ([facility] [address] 6/14/2022    Thyroid disease     hypo- managed with med     Past Surgical History:   Procedure Laterality Date    CHOLECYSTECTOMY      NEUROLOGICAL SURGERY  06/2017    C5 Fusion    MO ABDOMEN SURGERY PROC UNLISTED      GALLBLADDER    MO CARDIAC SURG PROCEDURE UNLIST      stents in 2009. (a total of 3 heart stents last one placed 2018) sees [facility]    PROSTATECTOMY N/A 7/21/2022    PROSTATECTOMY LAPAROSCOPIC ROBOTIC/ bilateral lymph node dissection performed by Manuela Hallman DO at [facility] MAIN OR       Social History/Living Environment:   Lives With: Spouse  Type of Home: [address] [place] [address],[address]: One level     Prior Level of Function/Work/Activity:   Prior level of function: Independent  Occupation: Retired  No data recordedNo data recorded   Learning:   Does the patient/guardian have any barriers to learning?: No barriers  Will there be a co-learner?: No  What is the preferred language of the patient/guardian?: English  Is an  required?: No  How does the patient/guardian prefer to learn new concepts?: Listening; Demonstration     Fall Risk Scale: Total Score: 20  Russell Fall Risk: Low (0-24)     Dominant Side:  right handed  Mr. Tati Lugo is 77 yo male referred to pelvic floor physical therapy (PFPT) 2/2 prostate cancer by Mercedes Leonard DO. Findings were found in routine screening. He underwent RALP on 7/21/22. Urinary: Frequency 5 x/day, 3-4 x/night. Positive for stress urinary incontinence, urge urinary incontinence, urinary frequency, and urinary hesitancy/intermittency.    Negative for palpation:  Muscle/muscle group Tender? Adductors [] Right  [] Left  [x]N/T   Gluteals [] Right  [] Left  [x]N/T   Piriformis [] Right  [] Left  [x]N/T   Iliopsoas [] Right  [] Left  [x]N/T   Abdominal wall [] Right  [] Left  [x]N/T   Pubic symphysis [] Right  [] Left  [x]N/T     Breath assessment:  Observation: chest breathing dominant  Coordination of pelvic floor muscles with breath: no pelvic floor muscle excursion  Co-contraction of PFM with transversus abdominis: present    Special tests/Movement screens:   Single leg stance:  NT  Deep squat:  NT  Active Straight Leg Raise (ASLR):  NT    Diastasis Recti    At umbilicus    1 inch above umbilicus    1 inch below umbilicus    TA contraction        ASSESSMENT   Initial Assessment:  7/18/22  Neema Jessica presents decreased coordination, strength and endurance of pelvic floor muscle pre operatively. Today he was educated on bladder health, kegel exercises, pelvic floor anatomy and role of musculature and precautions with catheter post operatively. He required verbal and tactile cuing for proper activation and isolation of pelvic floor muscles. He was able to contract muscle for 10 seconds with minimal breath holding and accessory muscle use of abdominals. He was given kegel exercises to do at home prior to surgery and once catheter is removed. He was educated on pain after surgery and precautions with kegel exercises. He was able to demonstrate improved coordination by the end of the session today. He will continue to benefit from skilled physical therapy to address above mentioned deficits and restore normal PFM function post operatively to minimize urinary incontinence. 8/11/22 REASSESSMENT: Mr. Mikala Roman presents s/p RALP on 7/21/22 2/2 prostate cancer. He is now experiencing stress urinary incontinence, requiring Depends briefs for protection. Urinary incontinence with stress related activities and/or urgency.  Provoking activities including getting in/out of bed, quick sudden movements, walking, sit to stand transitions, dressing, coughing, laughing and sneezing. He does report restriction of fluids due to UI. He demonstrates lack of coordination of PFM with moderate accessory use of abdominals. Pt will continue to benefit from skilled PT with an emphasis on PFM retraining, coordination, strength, integration with movement and bladder health education in order to return to PLOF. Problem List: (Impacting functional limitations): Body Structures, Functions, Activity Limitations Requiring Skilled Therapeutic Intervention: Increased pain; Decreased strength     Therapy Prognosis:   Therapy Prognosis: Good     Assessment Complexity:   Medium Complexity  PLAN   Effective Dates: 8/11/22 TO Plan of Care/Certification Expiration Date: 11/09/22     Frequency/Duration: Plan Frequency: 1x/week for 90 days     Interventions Planned (Treatment may consist of any combination of the following):    Current Treatment Recommendations: Strengthening; Manual Therapy - Soft Tissue Mobilization; Home exercise program; Patient/Caregiver education & training; Therapeutic activities         Short-Term Functional Goals: Time Frame: 4 weeks  Patient will demonstrate I with basic PFM HEP to improve awareness, coordination, and timing of PFM. Patient will verbalize an understanding of pelvic anatomy and causes of post op incontinence. Patient will demonstrate understanding of and ability to teach back appropriate water intake, bladder irritants, toileting frequency, and positioning for improved self-management of symptoms. Discharge Goals:   Pt will demonstrate appropriate co-contraction of the transversus abdominus and pelvic floor muscle group, and maintain 10 seconds to improve core stability and minimize YUKI with lifting, pushing/pulling tasks and transitional movements. Pt will report decreased pad usage to 2 PPD.   Patient will be independent with implementation of a timed voiding schedule and use of urge suppression to reduce urinary frequency to 4-5/day and 1/night. Patient will demonstrate ability to voluntarily contract the pelvic floor muscles prior to a cough or sneeze for decreased leakage during increases in intra-abdominal pressure. Patient will improve outcome score by 5. Patient will demonstrate independence with an advanced HEP for general conditioning, core stabilization, and mobility to facilitate carry over and independent management of symptoms. Outcome Measure:      Expanded Prostate Cancer Index Composite for Clinical Practice (EPIC-CP)  Score:  Initial: 12  Urinary Incontinence symptom score: 0/12  Urinary Irritation/Obstruction symptoms score: 4/12  Bowel symptoms score: 0/12  Sexual symptoms score: 5/12  Vitality/hormonal symptoms score: 3/12  Overall Prostate Cancer QOL score: 12/60 Most Recent: X (Date: -- )   Interpretation of Score: This survey asks questions concerning certain urinary, bowel and sexual symptoms. Each question is scored on a 0-4 scale, 4 representing the greatest disability. There are 5 sub-scores, each out of 12 and a total overall symptom score out of 60. The higher the score, suggests a higher disability. Medical Necessity:   Patient is expected to demonstrate progress in   strength and coordination   to   Reduce risk of incontinence  . Reason For Services/Other Comments:  Patient   continues to demonstrate capacity to improve pelvic floor strength and coordination which will   Reduce the risk of incontinence  . Total Duration:  Time In: 1101  Time Out: 1140    Regarding Donya Wilkerson's therapy, I certify that the treatment plan above will be carried out by a therapist or under their direction.   Thank you for this referral,  PRESTON Allen, PT     Referring Physician Signature: Claude Marti DO _______________________________ Date _____________        Post Session Pain  Charge Capture  PT Visit Info  POC Link  Treatment Note Link  MD Guidelines  MyChart

## 2022-11-21 ENCOUNTER — NURSE ONLY (OUTPATIENT)
Dept: UROLOGY | Age: 67
End: 2022-11-21

## 2022-11-21 DIAGNOSIS — C61 PROSTATE CANCER (HCC): ICD-10-CM

## 2022-11-22 LAB — PSA SERPL DL<=0.01 NG/ML-MCNC: <0.006 NG/ML (ref 0–4)

## 2022-11-28 ENCOUNTER — OFFICE VISIT (OUTPATIENT)
Dept: UROLOGY | Age: 67
End: 2022-11-28
Payer: MEDICARE

## 2022-11-28 DIAGNOSIS — C61 PROSTATE CANCER (HCC): Primary | ICD-10-CM

## 2022-11-28 DIAGNOSIS — N52.9 ERECTILE DYSFUNCTION, UNSPECIFIED ERECTILE DYSFUNCTION TYPE: ICD-10-CM

## 2022-11-28 LAB
BILIRUBIN, URINE, POC: NEGATIVE
BLOOD URINE, POC: NEGATIVE
GLUCOSE URINE, POC: 1000
KETONES, URINE, POC: NEGATIVE
LEUKOCYTE ESTERASE, URINE, POC: NEGATIVE
NITRITE, URINE, POC: NEGATIVE
PH, URINE, POC: 6 (ref 4.6–8)
PROTEIN,URINE, POC: NEGATIVE
SPECIFIC GRAVITY, URINE, POC: 1.02 (ref 1–1.03)
URINALYSIS CLARITY, POC: NORMAL
URINALYSIS COLOR, POC: NORMAL
UROBILINOGEN, POC: NORMAL

## 2022-11-28 PROCEDURE — 1123F ACP DISCUSS/DSCN MKR DOCD: CPT | Performed by: UROLOGY

## 2022-11-28 PROCEDURE — 99214 OFFICE O/P EST MOD 30 MIN: CPT | Performed by: UROLOGY

## 2022-11-28 PROCEDURE — 81003 URINALYSIS AUTO W/O SCOPE: CPT | Performed by: UROLOGY

## 2022-11-28 RX ORDER — SILDENAFIL 100 MG/1
100 TABLET, FILM COATED ORAL PRN
Qty: 20 TABLET | Refills: 6 | Status: SHIPPED | OUTPATIENT
Start: 2022-11-28

## 2022-11-28 RX ORDER — LANCETS 33 GAUGE
EACH MISCELLANEOUS
COMMUNITY
Start: 2022-08-26

## 2022-11-28 RX ORDER — ASPIRIN 81 MG/1
81 TABLET ORAL DAILY
COMMUNITY

## 2022-11-28 NOTE — PROGRESS NOTES
[facility]  [place], [address]  [phone]    Tati Gayle  : 1955     HPI   78 y.o., male returns in follow up for CaP. S/P RALP on 22. Path showed [place] 4+3, T2N0 with a focal positive R margin. He has done well post op. Cont to report 3-4 ppd YUKI. Reports headaches with Cialis. PSA is und on 22. Past Medical History:   Diagnosis Date    Arthritis     Benign hypertensive heart disease without heart failure 2016    Chronic pain     chronic back pain    Coronary atherosclerosis of native coronary vessel 2016    Stress test 2021 EF 69%    Diabetes ([address]     Type 2 po and insulin av -150 can Hypo below 80    DM type 2 (diabetes mellitus, type 2) ([address] 2016    Dyspnea     Hypertension     managed with meds    Malignant neoplasm of prostate ([address] 2022    Thyroid disease     hypo- managed with med     Past Surgical History:   Procedure Laterality Date    CHOLECYSTECTOMY      NEUROLOGICAL SURGERY  2017    C5 Fusion    AK ABDOMEN SURGERY PROC UNLISTED      GALLBLADDER    AK CARDIAC SURG PROCEDURE UNLIST      stents in . (a total of 3 heart stents last one placed ) sees [facility]    PROSTATECTOMY N/A 2022    PROSTATECTOMY LAPAROSCOPIC ROBOTIC/ bilateral lymph node dissection performed by Merideth Soulier, DO at [facility] MAIN OR     Current Outpatient Medications   Medication Sig Dispense Refill    Lancets (ONETOUCH DELICA PLUS [id number]) MISC USE AS DIRECTED THREE TIMES DAILY      aspirin 81 MG EC tablet Take 81 mg by mouth daily      sildenafil (VIAGRA) 100 MG tablet Take 1 tablet by mouth as needed for Erectile Dysfunction 20 tablet 6    metFORMIN (GLUCOPHAGE-XR) 500 MG extended release tablet       docusate sodium (COLACE, DULCOLAX) 100 MG CAPS Take 100 mg by mouth in the morning and 100 mg before bedtime.  60 capsule 2    losartan (COZAAR) 50 mg tablet TAKE ONE TABLET BY MOUTH ONE TIME DAILY 90 tablet 3    insulin 70-30 (HUMULIN;NOVOLIN) (70-30) 100 UNIT per ML injection vial Inject 35 Units into the skin nightly       levothyroxine (SYNTHROID) 100 MCG tablet Take 100 mcg by mouth every morning (before breakfast)      loratadine (CLARITIN) 10 MG tablet Take 10 mg by mouth daily as needed      LORazepam (ATIVAN) 1 MG tablet Take by mouth every 4 hours as needed. oxyCODONE HCl (OXY-IR) 10 MG immediate release tablet Take 10 mg by mouth 4 times daily as needed. rosuvastatin (CRESTOR) 5 MG tablet Take 5 mg by mouth daily        No current facility-administered medications for this visit. Allergies   Allergen Reactions    Ticagrelor Shortness Of Breath     Ill-tempered    Clopidogrel Rash     Social History     Socioeconomic History    Marital status:      Spouse name: Not on file    Number of children: Not on file    Years of education: Not on file    Highest education level: Not on file   Occupational History    Not on file   Tobacco Use    Smoking status: Never    Smokeless tobacco: Never   Substance and Sexual Activity    Alcohol use: No    Drug use: No    Sexual activity: Not on file   Other Topics Concern    Not on file   Social History Narrative    Not on file     Social Determinants of Health     Financial Resource Strain: Not on file   Food Insecurity: Not on file   Transportation Needs: Not on file   Physical Activity: Not on file   Stress: Not on file   Social Connections: Not on file   Intimate Partner Violence: Not on file   Housing Stability: Not on file     Family History   Problem Relation Age of Onset    Heart Attack Father     Heart Attack Mother     Coronary Art Dis Other         FAMILY HX       Review of Systems  All systems reviewed and are negative at this time. Physical Exam  There were no vitals taken for this visit.   General appearance - alert, well appearing, and in no distress  Mental status - alert, oriented to person, place, and time  Eyes - extraocular eye movements intact, sclera anicteric  Abdomen - soft, nontender, nondistended, no masses or organomegaly  Neurological -  normal speech, no focal findings or movement disorder noted  Skin - normal coloration and turgor      Urinalysis  UA - Dipstick  Results for orders placed or performed in visit on 11/28/22   AMB POC URINALYSIS DIP STICK AUTO W/O MICRO   Result Value Ref Range    Color (UA POC)      Clarity (UA POC)      Glucose, Urine, POC 1000 Negative    Bilirubin, Urine, POC Negative Negative    KETONES, Urine, POC Negative Negative    Specific Gravity, Urine, POC 1.020 1.001 - 1.035    Blood (UA POC) Negative Negative    pH, Urine, POC 6.0 4.6 - 8.0    Protein, Urine, POC Negative Negative    Urobilinogen, POC 0.2 mg/dL     Nitrite, Urine, POC Negative Negative    Leukocyte Esterase, Urine, POC Negative Negative       UA - Micro  WBC - 0  RBC - 0  Bacteria - 0  Epith - 0    Assessment/Plan    ICD-10-CM    1. Prostate cancer ([facility]  C61 AMB POC URINALYSIS DIP STICK AUTO W/O MICRO     PSA, ultrasensitive      2. Erectile dysfunction, unspecified erectile dysfunction type  N52.9         Pt elected to try sildenafil 100mg po prn #20 5 refills. RTO in 3 mo with PSA prior.     DELFINO TIRADO, DO

## 2023-01-31 ENCOUNTER — CLINICAL DOCUMENTATION (OUTPATIENT)
Dept: PHYSICAL THERAPY | Age: 68
End: 2023-01-31

## 2023-01-31 NOTE — THERAPY DISCHARGE
Ky Shaffer has been seen in physical therapy from 8/11/23 to 9/1/23. Treatment has been discontinued at this time due to patient failing to return for additional treatment due to being too far to travel. Some goals were not met due to not returning.    Thank you for this referral.

## 2023-02-28 ENCOUNTER — NURSE ONLY (OUTPATIENT)
Dept: UROLOGY | Age: 68
End: 2023-02-28

## 2023-02-28 DIAGNOSIS — C61 PROSTATE CANCER (HCC): ICD-10-CM

## 2023-03-02 LAB — PSA SERPL DL<=0.01 NG/ML-MCNC: 0.01 NG/ML (ref 0–4)

## 2023-03-30 ENCOUNTER — OFFICE VISIT (OUTPATIENT)
Dept: UROLOGY | Age: 68
End: 2023-03-30
Payer: MEDICARE

## 2023-03-30 DIAGNOSIS — C61 PROSTATE CANCER (HCC): Primary | ICD-10-CM

## 2023-03-30 DIAGNOSIS — N52.9 ERECTILE DYSFUNCTION, UNSPECIFIED ERECTILE DYSFUNCTION TYPE: ICD-10-CM

## 2023-03-30 LAB
BILIRUBIN, URINE, POC: NEGATIVE
BLOOD URINE, POC: NEGATIVE
GLUCOSE URINE, POC: 500
KETONES, URINE, POC: NEGATIVE
LEUKOCYTE ESTERASE, URINE, POC: NEGATIVE
NITRITE, URINE, POC: NEGATIVE
PH, URINE, POC: 7 (ref 4.6–8)
PROTEIN,URINE, POC: NEGATIVE
SPECIFIC GRAVITY, URINE, POC: 1.02 (ref 1–1.03)
URINALYSIS CLARITY, POC: NORMAL
URINALYSIS COLOR, POC: NORMAL
UROBILINOGEN, POC: NORMAL

## 2023-03-30 PROCEDURE — 1123F ACP DISCUSS/DSCN MKR DOCD: CPT | Performed by: UROLOGY

## 2023-03-30 PROCEDURE — 81003 URINALYSIS AUTO W/O SCOPE: CPT | Performed by: UROLOGY

## 2023-03-30 PROCEDURE — 99214 OFFICE O/P EST MOD 30 MIN: CPT | Performed by: UROLOGY

## 2023-03-30 NOTE — PROGRESS NOTES
[facility]  [place], [address]  [phone]    Alina Oropeza  : 1955     HPI   80 y.o., male returns in follow up for CaP. S/P RALP on 22. Path showed [place] 4+3, T2N0 with a focal positive R margin. He has done well post op. Cont to report 3 ppd YUKI. Reports headaches with Cialis. Remains on Viagra for rehab. PSA was und on 22 and is now 0.008 on 23. Past Medical History:   Diagnosis Date    Arthritis     Benign hypertensive heart disease without heart failure 2016    Chronic pain     chronic back pain    Coronary atherosclerosis of native coronary vessel 2016    Stress test 2021 EF 69%    Diabetes ([address]     Type 2 po and insulin av -150 can Hypo below 80    DM type 2 (diabetes mellitus, type 2) ([address] 2016    Dyspnea     Hypertension     managed with meds    Malignant neoplasm of prostate ([address] 2022    Thyroid disease     hypo- managed with med     Past Surgical History:   Procedure Laterality Date    CHOLECYSTECTOMY      NEUROLOGICAL SURGERY  2017    C5 Fusion    [place] UNLISTED PROCEDURE ABDOMEN PERITONEUM & OMENTUM      GALLBLADDER    OR UNLISTED PROCEDURE CARDIAC SURGERY      stents in . (a total of 3 heart stents last one placed ) sees [facility]    PROSTATECTOMY N/A 2022    PROSTATECTOMY LAPAROSCOPIC ROBOTIC/ bilateral lymph node dissection performed by Pedro Cruz DO at [facility] MAIN OR     Current Outpatient Medications   Medication Sig Dispense Refill    Lancets (ONETOUCH DELICA PLUS [id number]) MISC USE AS DIRECTED THREE TIMES DAILY      aspirin 81 MG EC tablet Take 81 mg by mouth daily      sildenafil (VIAGRA) 100 MG tablet Take 1 tablet by mouth as needed for Erectile Dysfunction 20 tablet 6    metFORMIN (GLUCOPHAGE-XR) 500 MG extended release tablet       docusate sodium (COLACE, DULCOLAX) 100 MG CAPS Take 100 mg by mouth in the morning and 100 mg before bedtime.  60 capsule 2

## 2023-07-11 ENCOUNTER — OFFICE VISIT (OUTPATIENT)
Dept: UROLOGY | Age: 68
End: 2023-07-11
Payer: MEDICARE

## 2023-07-11 DIAGNOSIS — C61 PROSTATE CANCER (HCC): ICD-10-CM

## 2023-07-11 DIAGNOSIS — N39.3 STRESS INCONTINENCE, MALE: ICD-10-CM

## 2023-07-11 DIAGNOSIS — C61 PROSTATE CANCER (HCC): Primary | ICD-10-CM

## 2023-07-11 PROCEDURE — 81003 URINALYSIS AUTO W/O SCOPE: CPT | Performed by: UROLOGY

## 2023-07-11 PROCEDURE — 99214 OFFICE O/P EST MOD 30 MIN: CPT | Performed by: UROLOGY

## 2023-07-11 PROCEDURE — 1123F ACP DISCUSS/DSCN MKR DOCD: CPT | Performed by: UROLOGY

## 2023-07-11 RX ORDER — INSULIN DEGLUDEC 200 U/ML
INJECTION, SOLUTION SUBCUTANEOUS
COMMUNITY
Start: 2023-06-18

## 2023-07-11 RX ORDER — PEN NEEDLE, DIABETIC 32GX 5/32"
NEEDLE, DISPOSABLE MISCELLANEOUS
COMMUNITY
Start: 2023-04-14

## 2023-07-11 NOTE — PROGRESS NOTES
[facility]  [address] [address]  [phone]    Tala Garcia  : 1955     HPI   80 y.o., male returns in follow up for CaP. S/P RALP on 22. Path showed Albert 4+3, T2N0 with a focal positive R margin. He has done well post op. Cont to report 2 ppd YUKI (slightly improved since last wk). Reports headaches with Cialis. Remains on Viagra for rehab. PSA was und on 22 and now 0.008 on 23. Past Medical History:   Diagnosis Date    Arthritis     Benign hypertensive heart disease without heart failure 2016    Chronic pain     chronic back pain    Coronary atherosclerosis of native coronary vessel 2016    Stress test 2021 EF 69%    Diabetes ([address])     Type 2 po and insulin av -150 can Hypo below 80    DM type 2 (diabetes mellitus, type 2) ([address]) 2016    Dyspnea     Hypertension     managed with meds    Malignant neoplasm of prostate ([address]) 2022    Thyroid disease     hypo- managed with med     Past Surgical History:   Procedure Laterality Date    CHOLECYSTECTOMY      NEUROLOGICAL SURGERY  2017    C5 Fusion    LA UNLISTED PROCEDURE ABDOMEN PERITONEUM & OMENTUM      GALLBLADDER    LA UNLISTED PROCEDURE CARDIAC SURGERY      stents in .  (a total of 3 heart stents last one placed ) sees [facility]    PROSTATECTOMY N/A 2022    PROSTATECTOMY LAPAROSCOPIC ROBOTIC/ bilateral lymph node dissection performed by You Saravia DO at [facility] MAIN OR     Current Outpatient Medications   Medication Sig Dispense Refill    TRESIBA FLEXTOUCH 200 UNIT/ML SOPN INJECT 50 UNITS SUBCUTANEOUSLY ONCE DAILY      RELION PEN NEEDLES 32G X 4 MM MISC USE BEFORE MEALS AND AT BEDTIME      Lancets (ONETOUCH DELICA PLUS [id number]) MISC USE AS DIRECTED THREE TIMES DAILY      aspirin 81 MG EC tablet Take 1 tablet by mouth daily      sildenafil (VIAGRA) 100 MG tablet Take 1 tablet by mouth as needed for Erectile Dysfunction 20 tablet 6

## 2023-07-13 LAB — PSA SERPL DL<=0.01 NG/ML-MCNC: 0.02 NG/ML (ref 0–4)

## 2024-01-09 ENCOUNTER — NURSE ONLY (OUTPATIENT)
Dept: UROLOGY | Age: 69
End: 2024-01-09

## 2024-01-09 DIAGNOSIS — C61 PROSTATE CANCER (HCC): ICD-10-CM

## 2024-01-10 LAB — PSA SERPL DL<=0.01 NG/ML-MCNC: 0.03 NG/ML (ref 0–4)

## 2024-01-16 ENCOUNTER — OFFICE VISIT (OUTPATIENT)
Dept: UROLOGY | Age: 69
End: 2024-01-16
Payer: MEDICARE

## 2024-01-16 DIAGNOSIS — C61 PROSTATE CANCER (HCC): Primary | ICD-10-CM

## 2024-01-16 LAB
BILIRUBIN, URINE, POC: NEGATIVE
BLOOD URINE, POC: NEGATIVE
GLUCOSE URINE, POC: 100
KETONES, URINE, POC: NEGATIVE
LEUKOCYTE ESTERASE, URINE, POC: NEGATIVE
NITRITE, URINE, POC: NEGATIVE
PH, URINE, POC: 6 (ref 4.6–8)
PROTEIN,URINE, POC: NEGATIVE
SPECIFIC GRAVITY, URINE, POC: 1.02 (ref 1–1.03)
URINALYSIS CLARITY, POC: NORMAL
URINALYSIS COLOR, POC: NORMAL
UROBILINOGEN, POC: NORMAL

## 2024-01-16 PROCEDURE — 1123F ACP DISCUSS/DSCN MKR DOCD: CPT | Performed by: UROLOGY

## 2024-01-16 PROCEDURE — 99214 OFFICE O/P EST MOD 30 MIN: CPT | Performed by: UROLOGY

## 2024-01-16 PROCEDURE — 81003 URINALYSIS AUTO W/O SCOPE: CPT | Performed by: UROLOGY

## 2024-01-16 NOTE — PROGRESS NOTES
systems reviewed and are negative at this time.    Physical Exam  There were no vitals taken for this visit.  General appearance - alert, well appearing, and in no distress  Mental status - alert, oriented to person, place, and time  Eyes - extraocular eye movements intact, sclera anicteric  Abdomen - soft, nontender, nondistended, no masses or organomegaly  Neurological -  normal speech, no focal findings or movement disorder noted  Skin - normal coloration and turgor      Urinalysis  UA - Dipstick  Results for orders placed or performed in visit on 01/16/24   AMB POC URINALYSIS DIP STICK AUTO W/O MICRO   Result Value Ref Range    Color, Urine, POC      Clarity, Urine, POC      Glucose, Urine,      Bilirubin, Urine, POC Negative     Ketones, Urine, POC Negative     Specific Gravity, Urine, POC 1.020 1.001 - 1.035    Blood, Urine, POC Negative     pH, Urine, POC 6.0 4.6 - 8.0    Protein, Urine, POC Negative     Urobilinogen, POC 0.2 mg/dL     Nitrite, Urine, POC Negative     Leukocyte Esterase, Urine, POC Negative          UA - Micro  WBC - 0  RBC - 0  Bacteria - 0  Epith - 0    Assessment/Plan    ICD-10-CM    1. Prostate cancer (HCC)  C61 AMB POC URINALYSIS DIP STICK AUTO W/O MICRO     PSA, ultrasensitive        RTO in 6 mo with PSA prior.    DELFINO TIRADO DO    Total time for today's encounter including chart review, result review, documentation and face to face encounter was 33 minutes.

## 2024-04-08 NOTE — PERIOP NOTE
----- Message from WANDA Conteh sent at 4/8/2024 10:43 AM CDT -----  Alfred Ahumada,    I'm so sorry, I was off most of last week and just returning to week now and seeing this. I was checking on her insurance for some other things as well, and it looks like it is now active again and updated in her chart.     Thanks!  Mary Burt, BRIAN, WANDA  Maternal and Child Health   M-F 08:00-16:30 on [employer] Phone: [phone]  [email or web address]    ----- Message -----  From: Charlette Gomez RN  Sent: 4/3/2024   2:00 PM CDT  To: WANDA Conteh!   I see that you have worked with Kaelyn during her pregnancy. Her hemoglobin is 9.3, which is really not where we would like it to be in somebody who is about to deliver.   Due to a lapse in insurance, she is unable to obtain IV iron infusions and was unable to  her oral iron supplements.    In the past, I've been able to reach out to social work and get a 20-50 dollar gift card that the patient could use to  medication from [employer]. Is this something you are still able to offer? We would really like her to be on some sort of iron supplement.    Let me know!  YADY Ahumada       When reassessing patient, bulb suction was  found no longer functional and there was sanguinous drainage to dressing. Called surgeon who stated he wound come to bedside to assess. Awaiting further orders.

## 2024-07-15 ENCOUNTER — LAB (OUTPATIENT)
Dept: UROLOGY | Age: 69
End: 2024-07-15

## 2024-07-15 DIAGNOSIS — C61 PROSTATE CANCER (HCC): ICD-10-CM

## 2024-07-18 LAB — PSA SERPL DL<=0.01 NG/ML-MCNC: 0.05 NG/ML (ref 0–4)

## 2024-07-22 ENCOUNTER — OFFICE VISIT (OUTPATIENT)
Dept: UROLOGY | Age: 69
End: 2024-07-22
Payer: MEDICARE

## 2024-07-22 DIAGNOSIS — C61 PROSTATE CANCER (HCC): Primary | ICD-10-CM

## 2024-07-22 PROCEDURE — 99213 OFFICE O/P EST LOW 20 MIN: CPT | Performed by: UROLOGY

## 2024-07-22 PROCEDURE — 81003 URINALYSIS AUTO W/O SCOPE: CPT | Performed by: UROLOGY

## 2024-07-22 PROCEDURE — 1123F ACP DISCUSS/DSCN MKR DOCD: CPT | Performed by: UROLOGY

## 2024-07-22 NOTE — PROGRESS NOTES
[facility]  [address] [place] [address]  [phone]    Anselmo Wilkerson  : 1955     HPI   69 y.o., male returns in follow up for CaP.  S/P RALP on 22.  Path showed Albert 4+3, T2N0 with a focal positive R margin.  He has done well post op.  Cont to report 1 ppd YUKI (slightly improved since last wk).  Reports headaches with Cialis.  Remains on Viagra for rehab.  Rarely active due to wife's medical conditions.  PSA was und on 22; 0.008 on 23; 0.016 on 23; 0.032 on 24 and is now 0.049 on 7/15/24.      Past Medical History:   Diagnosis Date    Arthritis     Benign hypertensive heart disease without heart failure 2016    Chronic pain     chronic back pain    Coronary atherosclerosis of native coronary vessel 2016    Stress test 2021 EF 69%    Diabetes (HCC)     Type 2 po and insulin av -150 can Hypo below 80    DM type 2 (diabetes mellitus, type 2) (HCC) 2016    Dyspnea     Hypertension     managed with meds    Malignant neoplasm of prostate (HCC) 2022    Thyroid disease     hypo- managed with med     Past Surgical History:   Procedure Laterality Date    CHOLECYSTECTOMY      NEUROLOGICAL SURGERY  2017    C5 Fusion    [place] UNLISTED PROCEDURE ABDOMEN PERITONEUM & OMENTUM      GALLBLADDER    [place] UNLISTED PROCEDURE CARDIAC SURGERY      stents in . (a total of 3 heart stents last one placed ) sees [facility]    PROSTATECTOMY N/A 2022    PROSTATECTOMY LAPAROSCOPIC ROBOTIC/ bilateral lymph node dissection performed by Roverto Marti DO at [facility] MAIN OR     Current Outpatient Medications   Medication Sig Dispense Refill    TRESIBA FLEXTOUCH 200 UNIT/ML SOPN INJECT 50 UNITS SUBCUTANEOUSLY ONCE DAILY      RELION PEN NEEDLES 32G X 4 MM MISC USE BEFORE MEALS AND AT BEDTIME      Lancets (ONETOUCH DELICA PLUS [id number]) MISC USE AS DIRECTED THREE TIMES DAILY      aspirin 81 MG EC tablet Take 1 tablet by mouth daily

## 2024-08-09 ENCOUNTER — TELEPHONE (OUTPATIENT)
Age: 69
End: 2024-08-09

## 2024-08-09 NOTE — TELEPHONE ENCOUNTER
Called and patient states he has been having a lot of SOB and lightheadness, will get up from his chair and feels like he is going to pass out, let pt know if he feels like that again needs to go to the ER and I will have one of the triage nurses call him to discuss further.

## 2024-08-09 NOTE — TELEPHONE ENCOUNTER
Pt.is calling reporting that yesterday almost passed out from sitting to standing and broke out in a cold sweat,dizzy and nauseas.BP was 160/88 after standing.Felt some chest pressure.Today has a soreness in his chest.This is similar to previous stents but not exactly the same.He thinks he has a blockage.    I offered appt.next week but he thinks he needs to head to the hospital.I feel the same.He will head to [facility] ER.

## 2024-08-12 ENCOUNTER — HOSPITAL ENCOUNTER (INPATIENT)
Age: 69
LOS: 1 days | Discharge: HOME | DRG: 287 | End: 2024-08-13
Attending: STUDENT IN AN ORGANIZED HEALTH CARE EDUCATION/TRAINING PROGRAM | Admitting: INTERNAL MEDICINE
Payer: MEDICARE

## 2024-08-12 ENCOUNTER — APPOINTMENT (OUTPATIENT)
Dept: GENERAL RADIOLOGY | Age: 69
DRG: 287 | End: 2024-08-12
Payer: MEDICARE

## 2024-08-12 DIAGNOSIS — R07.9 CHEST PAIN: ICD-10-CM

## 2024-08-12 DIAGNOSIS — R07.9 CHEST PAIN, UNSPECIFIED TYPE: Primary | ICD-10-CM

## 2024-08-12 PROBLEM — I24.9 ACS (ACUTE CORONARY SYNDROME) (HCC): Status: ACTIVE | Noted: 2024-08-12

## 2024-08-12 LAB
ALBUMIN SERPL-MCNC: 4.3 G/DL (ref 3.2–4.6)
ALBUMIN/GLOB SERPL: 1.5 (ref 1–1.9)
ALP SERPL-CCNC: 35 U/L (ref 40–129)
ALT SERPL-CCNC: 14 U/L (ref 12–65)
ANION GAP SERPL CALC-SCNC: 11 MMOL/L (ref 9–18)
AST SERPL-CCNC: 20 U/L (ref 15–37)
BASOPHILS # BLD: 0 K/UL (ref 0–0.2)
BASOPHILS NFR BLD: 0 % (ref 0–2)
BILIRUB SERPL-MCNC: 1.3 MG/DL (ref 0–1.2)
BUN SERPL-MCNC: 11 MG/DL (ref 8–23)
CALCIUM SERPL-MCNC: 9.7 MG/DL (ref 8.8–10.2)
CHLORIDE SERPL-SCNC: 99 MMOL/L (ref 98–107)
CO2 SERPL-SCNC: 26 MMOL/L (ref 20–28)
CREAT SERPL-MCNC: 0.89 MG/DL (ref 0.8–1.3)
DIFFERENTIAL METHOD BLD: NORMAL
ECHO BSA: 2.03 M2
EKG ATRIAL RATE: 99 BPM
EKG DIAGNOSIS: NORMAL
EKG P AXIS: 55 DEGREES
EKG P-R INTERVAL: 150 MS
EKG Q-T INTERVAL: 388 MS
EKG QRS DURATION: 142 MS
EKG QTC CALCULATION (BAZETT): 497 MS
EKG R AXIS: -1 DEGREES
EKG T AXIS: 104 DEGREES
EKG VENTRICULAR RATE: 99 BPM
EOSINOPHIL # BLD: 0.1 K/UL (ref 0–0.8)
EOSINOPHIL NFR BLD: 2 % (ref 0.5–7.8)
ERYTHROCYTE [DISTWIDTH] IN BLOOD BY AUTOMATED COUNT: 12.8 % (ref 11.9–14.6)
EST. AVERAGE GLUCOSE BLD GHB EST-MCNC: 223 MG/DL
GLOBULIN SER CALC-MCNC: 2.9 G/DL (ref 2.3–3.5)
GLUCOSE BLD STRIP.AUTO-MCNC: 206 MG/DL (ref 65–100)
GLUCOSE BLD STRIP.AUTO-MCNC: 252 MG/DL (ref 65–100)
GLUCOSE SERPL-MCNC: 249 MG/DL (ref 70–99)
HBA1C MFR BLD: 9.4 % (ref 0–5.6)
HCT VFR BLD AUTO: 44.1 % (ref 41.1–50.3)
HGB BLD-MCNC: 14.6 G/DL (ref 13.6–17.2)
IMM GRANULOCYTES # BLD AUTO: 0 K/UL (ref 0–0.5)
IMM GRANULOCYTES NFR BLD AUTO: 0 % (ref 0–5)
INR PPP: 1
LDLC SERPL DIRECT ASSAY-MCNC: 75 MG/DL (ref 0–100)
LYMPHOCYTES # BLD: 2.1 K/UL (ref 0.5–4.6)
LYMPHOCYTES NFR BLD: 30 % (ref 13–44)
MAGNESIUM SERPL-MCNC: 1.8 MG/DL (ref 1.8–2.4)
MCH RBC QN AUTO: 30.2 PG (ref 26.1–32.9)
MCHC RBC AUTO-ENTMCNC: 33.1 G/DL (ref 31.4–35)
MCV RBC AUTO: 91.1 FL (ref 82–102)
MONOCYTES # BLD: 0.4 K/UL (ref 0.1–1.3)
MONOCYTES NFR BLD: 6 % (ref 4–12)
NEUTS SEG # BLD: 4.3 K/UL (ref 1.7–8.2)
NEUTS SEG NFR BLD: 62 % (ref 43–78)
NRBC # BLD: 0 K/UL (ref 0–0.2)
PLATELET # BLD AUTO: 231 K/UL (ref 150–450)
PMV BLD AUTO: 9.8 FL (ref 9.4–12.3)
POTASSIUM SERPL-SCNC: 4.1 MMOL/L (ref 3.5–5.1)
PROT SERPL-MCNC: 7.2 G/DL (ref 6.3–8.2)
PROTHROMBIN TIME: 13.5 SEC (ref 11.3–14.9)
RBC # BLD AUTO: 4.84 M/UL (ref 4.23–5.6)
SARS-COV-2 RDRP RESP QL NAA+PROBE: NOT DETECTED
SERVICE CMNT-IMP: ABNORMAL
SERVICE CMNT-IMP: ABNORMAL
SODIUM SERPL-SCNC: 137 MMOL/L (ref 136–145)
SOURCE: NORMAL
T4 FREE SERPL-MCNC: 1.1 NG/DL (ref 0.9–1.7)
TROPONIN T SERPL HS-MCNC: 17 NG/L (ref 0–22)
TROPONIN T SERPL HS-MCNC: 18 NG/L (ref 0–22)
WBC # BLD AUTO: 7 K/UL (ref 4.3–11.1)

## 2024-08-12 PROCEDURE — 83036 HEMOGLOBIN GLYCOSYLATED A1C: CPT

## 2024-08-12 PROCEDURE — 83735 ASSAY OF MAGNESIUM: CPT

## 2024-08-12 PROCEDURE — 93458 L HRT ARTERY/VENTRICLE ANGIO: CPT | Performed by: INTERNAL MEDICINE

## 2024-08-12 PROCEDURE — 99285 EMERGENCY DEPT VISIT HI MDM: CPT

## 2024-08-12 PROCEDURE — C1769 GUIDE WIRE: HCPCS | Performed by: INTERNAL MEDICINE

## 2024-08-12 PROCEDURE — 6370000000 HC RX 637 (ALT 250 FOR IP): Performed by: INTERNAL MEDICINE

## 2024-08-12 PROCEDURE — 4A023N7 MEASUREMENT OF CARDIAC SAMPLING AND PRESSURE, LEFT HEART, PERCUTANEOUS APPROACH: ICD-10-PCS | Performed by: INTERNAL MEDICINE

## 2024-08-12 PROCEDURE — 6370000000 HC RX 637 (ALT 250 FOR IP): Performed by: STUDENT IN AN ORGANIZED HEALTH CARE EDUCATION/TRAINING PROGRAM

## 2024-08-12 PROCEDURE — 71045 X-RAY EXAM CHEST 1 VIEW: CPT

## 2024-08-12 PROCEDURE — 83695 ASSAY OF LIPOPROTEIN(A): CPT

## 2024-08-12 PROCEDURE — 80053 COMPREHEN METABOLIC PANEL: CPT

## 2024-08-12 PROCEDURE — 87635 SARS-COV-2 COVID-19 AMP PRB: CPT

## 2024-08-12 PROCEDURE — 6370000000 HC RX 637 (ALT 250 FOR IP): Performed by: NURSE PRACTITIONER

## 2024-08-12 PROCEDURE — B2111ZZ FLUOROSCOPY OF MULTIPLE CORONARY ARTERIES USING LOW OSMOLAR CONTRAST: ICD-10-PCS | Performed by: INTERNAL MEDICINE

## 2024-08-12 PROCEDURE — 2580000003 HC RX 258: Performed by: INTERNAL MEDICINE

## 2024-08-12 PROCEDURE — 6360000004 HC RX CONTRAST MEDICATION: Performed by: INTERNAL MEDICINE

## 2024-08-12 PROCEDURE — 2060000000 HC ICU INTERMEDIATE R&B

## 2024-08-12 PROCEDURE — 2500000003 HC RX 250 WO HCPCS: Performed by: INTERNAL MEDICINE

## 2024-08-12 PROCEDURE — 83721 ASSAY OF BLOOD LIPOPROTEIN: CPT

## 2024-08-12 PROCEDURE — 93005 ELECTROCARDIOGRAM TRACING: CPT | Performed by: INTERNAL MEDICINE

## 2024-08-12 PROCEDURE — 85610 PROTHROMBIN TIME: CPT

## 2024-08-12 PROCEDURE — 2709999900 HC NON-CHARGEABLE SUPPLY: Performed by: INTERNAL MEDICINE

## 2024-08-12 PROCEDURE — 83704 LIPOPROTEIN BLD QUAN PART: CPT

## 2024-08-12 PROCEDURE — 99152 MOD SED SAME PHYS/QHP 5/>YRS: CPT | Performed by: INTERNAL MEDICINE

## 2024-08-12 PROCEDURE — 84484 ASSAY OF TROPONIN QUANT: CPT

## 2024-08-12 PROCEDURE — 36415 COLL VENOUS BLD VENIPUNCTURE: CPT

## 2024-08-12 PROCEDURE — 82962 GLUCOSE BLOOD TEST: CPT

## 2024-08-12 PROCEDURE — 93010 ELECTROCARDIOGRAM REPORT: CPT | Performed by: INTERNAL MEDICINE

## 2024-08-12 PROCEDURE — C1894 INTRO/SHEATH, NON-LASER: HCPCS | Performed by: INTERNAL MEDICINE

## 2024-08-12 PROCEDURE — 85025 COMPLETE CBC W/AUTO DIFF WBC: CPT

## 2024-08-12 PROCEDURE — 6360000002 HC RX W HCPCS: Performed by: INTERNAL MEDICINE

## 2024-08-12 PROCEDURE — 6370000000 HC RX 637 (ALT 250 FOR IP): Performed by: PHYSICIAN ASSISTANT

## 2024-08-12 PROCEDURE — 84439 ASSAY OF FREE THYROXINE: CPT

## 2024-08-12 RX ORDER — OXYCODONE HYDROCHLORIDE 5 MG/1
7.5 TABLET ORAL EVERY 4 HOURS PRN
Status: DISCONTINUED | OUTPATIENT
Start: 2024-08-12 | End: 2024-08-13 | Stop reason: HOSPADM

## 2024-08-12 RX ORDER — POTASSIUM CHLORIDE 7.45 MG/ML
10 INJECTION INTRAVENOUS PRN
Status: DISCONTINUED | OUTPATIENT
Start: 2024-08-12 | End: 2024-08-13 | Stop reason: HOSPADM

## 2024-08-12 RX ORDER — MAGNESIUM SULFATE IN WATER 40 MG/ML
2000 INJECTION, SOLUTION INTRAVENOUS PRN
Status: DISCONTINUED | OUTPATIENT
Start: 2024-08-12 | End: 2024-08-13 | Stop reason: HOSPADM

## 2024-08-12 RX ORDER — SODIUM CHLORIDE 0.9 % (FLUSH) 0.9 %
5-40 SYRINGE (ML) INJECTION PRN
Status: DISCONTINUED | OUTPATIENT
Start: 2024-08-12 | End: 2024-08-13 | Stop reason: HOSPADM

## 2024-08-12 RX ORDER — POTASSIUM CHLORIDE 20 MEQ/1
40 TABLET, EXTENDED RELEASE ORAL PRN
Status: DISCONTINUED | OUTPATIENT
Start: 2024-08-12 | End: 2024-08-13 | Stop reason: HOSPADM

## 2024-08-12 RX ORDER — LOSARTAN POTASSIUM 50 MG/1
50 TABLET ORAL DAILY
Status: DISCONTINUED | OUTPATIENT
Start: 2024-08-12 | End: 2024-08-13 | Stop reason: HOSPADM

## 2024-08-12 RX ORDER — AMLODIPINE BESYLATE 5 MG/1
5 TABLET ORAL DAILY
Status: DISCONTINUED | OUTPATIENT
Start: 2024-08-12 | End: 2024-08-13 | Stop reason: HOSPADM

## 2024-08-12 RX ORDER — MIDAZOLAM HYDROCHLORIDE 1 MG/ML
INJECTION INTRAMUSCULAR; INTRAVENOUS PRN
Status: DISCONTINUED | OUTPATIENT
Start: 2024-08-12 | End: 2024-08-12 | Stop reason: HOSPADM

## 2024-08-12 RX ORDER — ATORVASTATIN CALCIUM 80 MG/1
80 TABLET, FILM COATED ORAL NIGHTLY
Status: DISCONTINUED | OUTPATIENT
Start: 2024-08-12 | End: 2024-08-12 | Stop reason: SDUPTHER

## 2024-08-12 RX ORDER — OXYCODONE HYDROCHLORIDE 15 MG/1
7.5 TABLET ORAL EVERY 4 HOURS PRN
COMMUNITY

## 2024-08-12 RX ORDER — SODIUM CHLORIDE 9 MG/ML
INJECTION, SOLUTION INTRAVENOUS PRN
Status: DISCONTINUED | OUTPATIENT
Start: 2024-08-12 | End: 2024-08-13 | Stop reason: HOSPADM

## 2024-08-12 RX ORDER — POLYETHYLENE GLYCOL 3350 17 G/17G
17 POWDER, FOR SOLUTION ORAL DAILY PRN
Status: DISCONTINUED | OUTPATIENT
Start: 2024-08-12 | End: 2024-08-13 | Stop reason: HOSPADM

## 2024-08-12 RX ORDER — LIDOCAINE HYDROCHLORIDE 10 MG/ML
INJECTION, SOLUTION INFILTRATION; PERINEURAL PRN
Status: DISCONTINUED | OUTPATIENT
Start: 2024-08-12 | End: 2024-08-12 | Stop reason: HOSPADM

## 2024-08-12 RX ORDER — LEVOTHYROXINE SODIUM 0.1 MG/1
100 TABLET ORAL
Status: DISCONTINUED | OUTPATIENT
Start: 2024-08-13 | End: 2024-08-13 | Stop reason: HOSPADM

## 2024-08-12 RX ORDER — ROSUVASTATIN CALCIUM 5 MG/1
5 TABLET, COATED ORAL DAILY
Status: DISCONTINUED | OUTPATIENT
Start: 2024-08-12 | End: 2024-08-13

## 2024-08-12 RX ORDER — ENOXAPARIN SODIUM 100 MG/ML
40 INJECTION SUBCUTANEOUS EVERY 24 HOURS
Status: DISCONTINUED | OUTPATIENT
Start: 2024-08-13 | End: 2024-08-13 | Stop reason: HOSPADM

## 2024-08-12 RX ORDER — ACETAMINOPHEN 325 MG/1
650 TABLET ORAL EVERY 6 HOURS PRN
Status: DISCONTINUED | OUTPATIENT
Start: 2024-08-12 | End: 2024-08-13 | Stop reason: HOSPADM

## 2024-08-12 RX ORDER — METFORMIN HYDROCHLORIDE 500 MG/1
500 TABLET, EXTENDED RELEASE ORAL
Status: DISCONTINUED | OUTPATIENT
Start: 2024-08-13 | End: 2024-08-12

## 2024-08-12 RX ORDER — LORAZEPAM 1 MG/1
1 TABLET ORAL 2 TIMES DAILY PRN
Status: DISCONTINUED | OUTPATIENT
Start: 2024-08-12 | End: 2024-08-13 | Stop reason: HOSPADM

## 2024-08-12 RX ORDER — NITROGLYCERIN 20 MG/100ML
INJECTION INTRAVENOUS PRN
Status: DISCONTINUED | OUTPATIENT
Start: 2024-08-12 | End: 2024-08-12 | Stop reason: HOSPADM

## 2024-08-12 RX ORDER — ASPIRIN 81 MG/1
324 TABLET, CHEWABLE ORAL ONCE
Status: COMPLETED | OUTPATIENT
Start: 2024-08-12 | End: 2024-08-12

## 2024-08-12 RX ORDER — SODIUM CHLORIDE 9 MG/ML
INJECTION, SOLUTION INTRAVENOUS CONTINUOUS
Status: DISCONTINUED | OUTPATIENT
Start: 2024-08-12 | End: 2024-08-13 | Stop reason: HOSPADM

## 2024-08-12 RX ORDER — ONDANSETRON 2 MG/ML
4 INJECTION INTRAMUSCULAR; INTRAVENOUS EVERY 6 HOURS PRN
Status: DISCONTINUED | OUTPATIENT
Start: 2024-08-12 | End: 2024-08-13 | Stop reason: HOSPADM

## 2024-08-12 RX ORDER — ASPIRIN 81 MG/1
81 TABLET, CHEWABLE ORAL DAILY
Status: DISCONTINUED | OUTPATIENT
Start: 2024-08-13 | End: 2024-08-12 | Stop reason: SDUPTHER

## 2024-08-12 RX ORDER — ONDANSETRON 4 MG/1
4 TABLET, ORALLY DISINTEGRATING ORAL EVERY 8 HOURS PRN
Status: DISCONTINUED | OUTPATIENT
Start: 2024-08-12 | End: 2024-08-13 | Stop reason: HOSPADM

## 2024-08-12 RX ORDER — SODIUM CHLORIDE 0.9 % (FLUSH) 0.9 %
5-40 SYRINGE (ML) INJECTION EVERY 12 HOURS SCHEDULED
Status: DISCONTINUED | OUTPATIENT
Start: 2024-08-12 | End: 2024-08-13 | Stop reason: HOSPADM

## 2024-08-12 RX ORDER — ASPIRIN 81 MG/1
81 TABLET ORAL DAILY
Status: DISCONTINUED | OUTPATIENT
Start: 2024-08-12 | End: 2024-08-13 | Stop reason: HOSPADM

## 2024-08-12 RX ORDER — INSULIN LISPRO 100 [IU]/ML
0-4 INJECTION, SOLUTION INTRAVENOUS; SUBCUTANEOUS NIGHTLY
Status: DISCONTINUED | OUTPATIENT
Start: 2024-08-12 | End: 2024-08-13 | Stop reason: HOSPADM

## 2024-08-12 RX ORDER — INSULIN LISPRO 100 [IU]/ML
0-4 INJECTION, SOLUTION INTRAVENOUS; SUBCUTANEOUS
Status: DISCONTINUED | OUTPATIENT
Start: 2024-08-12 | End: 2024-08-13 | Stop reason: HOSPADM

## 2024-08-12 RX ORDER — HEPARIN SODIUM 200 [USP'U]/100ML
INJECTION, SOLUTION INTRAVENOUS CONTINUOUS PRN
Status: COMPLETED | OUTPATIENT
Start: 2024-08-12 | End: 2024-08-12

## 2024-08-12 RX ADMIN — AMLODIPINE BESYLATE 5 MG: 5 TABLET ORAL at 18:00

## 2024-08-12 RX ADMIN — INSULIN LISPRO 2 UNITS: 100 INJECTION, SOLUTION INTRAVENOUS; SUBCUTANEOUS at 18:00

## 2024-08-12 RX ADMIN — OXYCODONE 7.5 MG: 5 TABLET ORAL at 22:25

## 2024-08-12 RX ADMIN — SODIUM CHLORIDE, PRESERVATIVE FREE 10 ML: 5 INJECTION INTRAVENOUS at 21:03

## 2024-08-12 RX ADMIN — ROSUVASTATIN CALCIUM 5 MG: 5 TABLET, COATED ORAL at 18:08

## 2024-08-12 RX ADMIN — ASPIRIN 81 MG 324 MG: 81 TABLET ORAL at 11:44

## 2024-08-12 RX ADMIN — LOSARTAN POTASSIUM 50 MG: 50 TABLET, FILM COATED ORAL at 18:00

## 2024-08-12 RX ADMIN — LORAZEPAM 1 MG: 1 TABLET ORAL at 22:25

## 2024-08-12 ASSESSMENT — PAIN DESCRIPTION - DESCRIPTORS
DESCRIPTORS: ACHING
DESCRIPTORS: HEAVINESS

## 2024-08-12 ASSESSMENT — PAIN - FUNCTIONAL ASSESSMENT: PAIN_FUNCTIONAL_ASSESSMENT: 0-10

## 2024-08-12 ASSESSMENT — PAIN SCALES - GENERAL
PAINLEVEL_OUTOF10: 7
PAINLEVEL_OUTOF10: 5
PAINLEVEL_OUTOF10: 5

## 2024-08-12 ASSESSMENT — PAIN DESCRIPTION - LOCATION
LOCATION: CHEST
LOCATION: CHEST
LOCATION: NECK;SHOULDER

## 2024-08-12 ASSESSMENT — PAIN DESCRIPTION - ORIENTATION
ORIENTATION: MID
ORIENTATION: RIGHT

## 2024-08-12 ASSESSMENT — LIFESTYLE VARIABLES
HOW MANY STANDARD DRINKS CONTAINING ALCOHOL DO YOU HAVE ON A TYPICAL DAY: PATIENT DOES NOT DRINK
HOW OFTEN DO YOU HAVE A DRINK CONTAINING ALCOHOL: NEVER

## 2024-08-12 NOTE — ED PROVIDER NOTES
Emergency Department Provider Note       PCP: Anselmo Huff MD   Age: 69 y.o.   Sex: male     DISPOSITION Decision To Admit 08/12/2024 12:10:03 PM       ICD-10-CM    1. Chest pain, unspecified type  R07.9           Medical Decision Making     69-year-old male with history of CAD status post stent x3 on daily ASA, HTN, HLD, presents to the ED via POV with chest pain.  Patient states for the past week he has had some heaviness over the center of his chest with some associated shortness of breath.  He states that 2 days ago he had an episode where the pain became very severe and he felt extremely lightheaded as though he was going to pass out, his shortness of breath was worse at that time and he noticed some tachycardic and heavy palpitations as well as diaphoresis, pallor, and nausea.  He states it began while sitting down but then worsened when he stood up.  It lasted for a while before spontaneously resolving.  He had another episode last night that was similar but not as intense.  He has continued to have some slight chest heaviness but again states that it is much worse when he has those episodes of lightheadedness.  He currently has some heaviness over the chest and some mild shortness of breath.  He denies headache, vision change, peripheral numbness/weakness, abdominal pain, vomiting, diarrhea, urinary symptoms, lower extremity pain/swelling, or any other acute complaints.  Last stent was placed approximately 5 years ago and he has had no heart catheterization since then and denies any recent stress test.  He has been taking all his medications as prescribed without recent changes.  No tobacco use.  On arrival to ED his vitals are reassuring.  On exam he is resting of the no stress.  He has a normal cardiac examination with equal pulses in all extremities and lower extremity edema or stigmata of DVT.  Lungs are clear in all fields.  He is overall very well-appearing especially considering his age.

## 2024-08-12 NOTE — H&P
known hx of CAD  -ROBI score of 4; indicates 20% risk at 14 days of all-cause mortality  -Trend troponoins  -Plan for left heart cath today; NPO until after cath  -Order EKG and TTE  -Continue ASA 81mg    Insulin-dependent T2DM  -Pt on 50 units of Tresiba and metformin at home  -basal insulin while inpatient + SSI  -Consider optimization of diabetes meds upon discharge  -order HbA1C levels    HTN  -continue home losartan    HLD  -Continue home atorvastatin  -Order lipid panel, LPa    Hypothyroidism  -Continue home levothyroxine dose  -Order TSH, T4 levels     Consuelo Chung    I have personally seen and evaluated the patient and reviewed the students note and agree with the following assessment and plan and findings. I was present for and observed the key components of this note.  Any appropriate additions or editing of the information have been done by me.    Hollis Penaloza MD, FACC  Cardiology

## 2024-08-12 NOTE — ED TRIAGE NOTES
Pt arrives ambulatory to ED c/o chest pain and shortness of breath since yesterday. Endorses radiating pain to jaw and shoulder. Pt states he had near syncopal episode on Saturday night. Pmhx of CAD. Has 3 stents placed.

## 2024-08-12 NOTE — PROGRESS NOTES
1730: Upon admission assessment, pt asked if he brought his home medications with him to the hospital. Pt declined.     1808: This RN entered room and saw patient holding a prescription bottle. When asked what it was, pt stated he just took his nightly oxycodone \"half a pill\". This RN educated patient on importance of only taking medications provided to him from the hospital while inpatient. Pt verbalized understanding. Per pt, he took 7.5 mg of oxycodone before this RN entered room. Prescription bottle is for 15 mg.     1 mg ativan also found in bag. Both bottles taken from patient, counted with second RN and locked in narc box. Pt educated that his meds will be returned upon discharge.     Karie Hutson NP notified. Ativan/oxycodone home meds ordered.    Night shift RN [facility] aware that pt took 7.5 mg oxy around 1800.

## 2024-08-12 NOTE — PROGRESS NOTES
TRANSFER - IN REPORT:    Verbal report received from KAYLEEN Jimenez on Anselmo Wilkerson being received from cath lab for routine progression of patient care      Report consisted of patient’s Situation, Background, Assessment and Recommendations(SBAR).     Information from the following report(s) Procedure Summary was reviewed with the receiving nurse.    Opportunity for questions and clarification was provided.      Assessment completed upon patient’s arrival to unit and care assumed.

## 2024-08-12 NOTE — FLOWSHEET NOTE
08/12/24 1730   Dual Clinician Skin Assessment   Dual Skin Assessment (4 Eyes) WDL   Second Clinical  (First and Last Name) Doreen BEYER   Skin Integumentary    Skin Integumentary (WDL) X   Skin Integrity Ecchymosis;Incision (see LDA);Scars (comment)   Skin Color Pale;Ecchymosis (comment)   Skin Condition/Temp Warm;Dry   Location R radial s/p [facility]     4 Eyes Skin Assessment     NAME:  Anselmo Wilkerson  YOB: 1955  MEDICAL RECORD NUMBER:  [id number]    The patient is being assessed for  Admission    I agree that at least one RN has performed a thorough Head to Toe Skin Assessment on the patient. ALL assessment sites listed below have been assessed.      Areas assessed by both nurses:    Head, Face, Ears, Shoulders, Back, Chest, Arms, Elbows, Hands, Sacrum. Buttock, Coccyx, Ischium, and Legs. Feet and Heels        Does the Patient have a Wound? No noted wound(s)       Keith Prevention initiated by RN: No  Wound Care Orders initiated by RN: No    Pressure Injury (Stage 3,4, Unstageable, DTI, NWPT, and Complex wounds) if present, place Wound referral order by RN under : No    New Ostomies, if present place, Ostomy referral order under : No     Nurse 1 eSignature: Electronically signed by Almita Kirk RN on 8/12/24 at 6:30 PM EDT    **SHARE this note so that the co-signing nurse can place an eSignature**    Nurse 2 eSignature: {Esignature:[id number]}     Sacrum and heels intact and free from redness. R radial site. No other impairments noted.   
---

## 2024-08-12 NOTE — PROGRESS NOTES
TRANSFER - OUT REPORT:    Verbal report given to KAYLEEN Ulloa on Anselmo Wilkerson  being transferred to CPRU for routine progression of patient care       Report consisted of patient's Situation, Background, Assessment and   Recommendations(SBAR).     Information from the following report(s) Nurse Handoff Report was reviewed with the receiving nurse.         [facility] with Dr Penaloza  No interventions  R Radial  TR band at 12mL  Versed 4mg  Heparin 5000 units

## 2024-08-12 NOTE — PROGRESS NOTES
Radial compression band removed at 1845 after slowly reducing air from 12 cc to zero as per hospital protocol.  No bleeding or hematoma noted. 2 x 2 gauze with tegaderm placed over puncture site. The affected extremity is warm and dry to the touch. Frequent vital signs printed and placed on bedside chart.  Patient instructed to call if any bleeding noted on gauze.  Patient verbalized understanding the nursing instructions.

## 2024-08-12 NOTE — PROGRESS NOTES
TRANSFER - IN REPORT:    Verbal report received from Artemio BEYER on Anselmo Wilkerson  being received from CPRU for routine progression of patient care      Report consisted of patient's Situation, Background, Assessment and   Recommendations(SBAR).     Information from the following report(s) Nurse Handoff Report, Surgery Report, MAR, Cardiac Rhythm SR, and Neuro Assessment was reviewed with the receiving nurse.    Opportunity for questions and clarification was provided.      Assessment completed upon patient's arrival to unit and care assumed.      R radial s/p LHC. No interventions. Medically managing with norvasc ([facility]).

## 2024-08-13 ENCOUNTER — APPOINTMENT (OUTPATIENT)
Dept: NON INVASIVE DIAGNOSTICS | Age: 69
DRG: 287 | End: 2024-08-13
Attending: INTERNAL MEDICINE
Payer: MEDICARE

## 2024-08-13 VITALS
DIASTOLIC BLOOD PRESSURE: 78 MMHG | OXYGEN SATURATION: 96 % | HEART RATE: 89 BPM | RESPIRATION RATE: 18 BRPM | TEMPERATURE: 97.9 F | BODY MASS INDEX: 28.28 KG/M2 | HEIGHT: 68 IN | SYSTOLIC BLOOD PRESSURE: 126 MMHG | WEIGHT: 186.6 LBS

## 2024-08-13 PROBLEM — I20.1 CORONARY VASOSPASM (HCC): Status: ACTIVE | Noted: 2024-08-12

## 2024-08-13 PROBLEM — R07.9 CHEST PAIN: Status: ACTIVE | Noted: 2024-08-13

## 2024-08-13 LAB
ANION GAP SERPL CALC-SCNC: 9 MMOL/L (ref 9–18)
BUN SERPL-MCNC: 10 MG/DL (ref 8–23)
CALCIUM SERPL-MCNC: 9.2 MG/DL (ref 8.8–10.2)
CHLORIDE SERPL-SCNC: 104 MMOL/L (ref 98–107)
CO2 SERPL-SCNC: 24 MMOL/L (ref 20–28)
CREAT SERPL-MCNC: 0.67 MG/DL (ref 0.8–1.3)
ERYTHROCYTE [DISTWIDTH] IN BLOOD BY AUTOMATED COUNT: 12.9 % (ref 11.9–14.6)
GLUCOSE BLD STRIP.AUTO-MCNC: 207 MG/DL (ref 65–100)
GLUCOSE SERPL-MCNC: 156 MG/DL (ref 70–99)
HCT VFR BLD AUTO: 43.1 % (ref 41.1–50.3)
HGB BLD-MCNC: 14.3 G/DL (ref 13.6–17.2)
LPA SERPL-SCNC: 38.2 NMOL/L
MCH RBC QN AUTO: 30.2 PG (ref 26.1–32.9)
MCHC RBC AUTO-ENTMCNC: 33.2 G/DL (ref 31.4–35)
MCV RBC AUTO: 91.1 FL (ref 82–102)
NRBC # BLD: 0 K/UL (ref 0–0.2)
PLATELET # BLD AUTO: 214 K/UL (ref 150–450)
PMV BLD AUTO: 9.6 FL (ref 9.4–12.3)
POTASSIUM SERPL-SCNC: 4 MMOL/L (ref 3.5–5.1)
RBC # BLD AUTO: 4.73 M/UL (ref 4.23–5.6)
SERVICE CMNT-IMP: ABNORMAL
SODIUM SERPL-SCNC: 138 MMOL/L (ref 136–145)
WBC # BLD AUTO: 6.3 K/UL (ref 4.3–11.1)

## 2024-08-13 PROCEDURE — 36415 COLL VENOUS BLD VENIPUNCTURE: CPT

## 2024-08-13 PROCEDURE — 6370000000 HC RX 637 (ALT 250 FOR IP): Performed by: PHYSICIAN ASSISTANT

## 2024-08-13 PROCEDURE — 2580000003 HC RX 258: Performed by: INTERNAL MEDICINE

## 2024-08-13 PROCEDURE — 85027 COMPLETE CBC AUTOMATED: CPT

## 2024-08-13 PROCEDURE — 6370000000 HC RX 637 (ALT 250 FOR IP): Performed by: NURSE PRACTITIONER

## 2024-08-13 PROCEDURE — 80048 BASIC METABOLIC PNL TOTAL CA: CPT

## 2024-08-13 PROCEDURE — 6370000000 HC RX 637 (ALT 250 FOR IP): Performed by: INTERNAL MEDICINE

## 2024-08-13 PROCEDURE — 82962 GLUCOSE BLOOD TEST: CPT

## 2024-08-13 RX ORDER — AMLODIPINE BESYLATE 5 MG/1
5 TABLET ORAL DAILY
Qty: 30 TABLET | Refills: 3 | Status: SHIPPED | OUTPATIENT
Start: 2024-08-13

## 2024-08-13 RX ORDER — ROSUVASTATIN CALCIUM 20 MG/1
20 TABLET, COATED ORAL DAILY
Status: DISCONTINUED | OUTPATIENT
Start: 2024-08-13 | End: 2024-08-13 | Stop reason: HOSPADM

## 2024-08-13 RX ADMIN — INSULIN LISPRO 1 UNITS: 100 INJECTION, SOLUTION INTRAVENOUS; SUBCUTANEOUS at 07:34

## 2024-08-13 RX ADMIN — SODIUM CHLORIDE, PRESERVATIVE FREE 10 ML: 5 INJECTION INTRAVENOUS at 08:03

## 2024-08-13 RX ADMIN — OXYCODONE 7.5 MG: 5 TABLET ORAL at 08:03

## 2024-08-13 RX ADMIN — ROSUVASTATIN CALCIUM 20 MG: 20 TABLET, FILM COATED ORAL at 08:02

## 2024-08-13 RX ADMIN — ASPIRIN 81 MG: 81 TABLET, COATED ORAL at 08:02

## 2024-08-13 RX ADMIN — LEVOTHYROXINE SODIUM 100 MCG: 0.1 TABLET ORAL at 05:48

## 2024-08-13 RX ADMIN — AMLODIPINE BESYLATE 5 MG: 5 TABLET ORAL at 08:02

## 2024-08-13 RX ADMIN — LOSARTAN POTASSIUM 50 MG: 50 TABLET, FILM COATED ORAL at 08:03

## 2024-08-13 ASSESSMENT — PAIN DESCRIPTION - LOCATION: LOCATION: NECK;SHOULDER

## 2024-08-13 ASSESSMENT — PAIN SCALES - GENERAL
PAINLEVEL_OUTOF10: 0
PAINLEVEL_OUTOF10: 6

## 2024-08-13 ASSESSMENT — PAIN DESCRIPTION - DESCRIPTORS: DESCRIPTORS: ACHING

## 2024-08-13 ASSESSMENT — PAIN DESCRIPTION - ORIENTATION: ORIENTATION: MID

## 2024-08-13 ASSESSMENT — PAIN DESCRIPTION - PAIN TYPE: TYPE: CHRONIC PAIN

## 2024-08-13 NOTE — CARE COORDINATION
Patient admitted with chest pain. S/P [facility] with no indication for PCI. Diagnosed vasospasm.  CM scanned medical record. No CM need identified.  Transition of care to home with family assistance.     08/13/24 1119   Service Assessment   Patient Orientation Alert and Oriented   Cognition Alert   History Provided By Medical Record   Primary Caregiver Self   Accompanied By/Relationship Unknown   Support Systems Spouse/Significant Other;Children;Friends/Neighbors   PCP Verified by CM Yes   Last Visit to PCP Within last 3 months   Prior Functional Level Independent in ADLs/IADLs   Current Functional Level Independent in ADLs/IADLs   Can patient return to prior living arrangement Yes   Ability to make needs known: Good   Family able to assist with home care needs: Yes   Would you like for me to discuss the discharge plan with any other family members/significant others, and if so, who? No   Financial Resources Medicare   Community Resources None   CM/SW Referral Other (see comment)  (N/A)   Social/Functional History   Lives With Spouse   Type of Home House   Occupation Retired   Discharge Planning   Type of Residence House   Living Arrangements Spouse/Significant Other   Current Services Prior To Admission None   Potential Assistance Needed N/A   DME Ordered? No   Potential Assistance Purchasing Medications No   Type of Home Care Services None   Patient expects to be discharged to: House   Services At/After Discharge   Transition of Care Consult (CM Consult) Discharge Planning   Services At/After Discharge None    Resource Information Provided? No   Mode of Transport at Discharge Other (see comment)  (Personal scheduling by car.)   Confirm Follow Up Transport Family   Condition of Participation: Discharge Planning   The Plan for Transition of Care is related to the following treatment goals: Home with family assistance   The Patient and/Or Patient Representative agree with the Discharge Plan? Yes

## 2024-08-13 NOTE — PLAN OF CARE
Problem: Discharge Planning  Goal: Discharge to home or other facility with appropriate resources  Outcome: Progressing  Flowsheets  Taken 8/12/2024 2102 by Miriam Rodriguez RN  Discharge to home or other facility with appropriate resources:   Arrange for needed discharge resources and transportation as appropriate   Arrange for interpreters to assist at discharge as needed   Identify discharge learning needs (meds, wound care, etc)   Refer to discharge planning if patient needs post-hospital services based on physician order or complex needs related to functional status, cognitive ability or social support system  Taken 8/12/2024 1730 by Almita Kirk RN  Discharge to home or other facility with appropriate resources:   Identify barriers to discharge with patient and caregiver   Arrange for needed discharge resources and transportation as appropriate   Identify discharge learning needs (meds, wound care, etc)   Arrange for interpreters to assist at discharge as needed   Refer to discharge planning if patient needs post-hospital services based on physician order or complex needs related to functional status, cognitive ability or social support system

## 2024-08-13 NOTE — PLAN OF CARE
Problem: Chronic Conditions and Co-morbidities  Goal: Patient's chronic conditions and co-morbidity symptoms are monitored and maintained or improved  Outcome: Completed     Problem: Discharge Planning  Goal: Discharge to home or other facility with appropriate resources  8/13/2024 0808 by Kelsi Brasher RN  Outcome: Completed  8/12/2024 2102 by Miriam Rodriguez RN  Outcome: Progressing  Flowsheets  Taken 8/12/2024 2102 by Miriam Rodriguez RN  Discharge to home or other facility with appropriate resources:   Arrange for needed discharge resources and transportation as appropriate   Arrange for interpreters to assist at discharge as needed   Identify discharge learning needs (meds, wound care, etc)   Refer to discharge planning if patient needs post-hospital services based on physician order or complex needs related to functional status, cognitive ability or social support system  Taken 8/12/2024 1730 by Almita Kirk RN  Discharge to home or other facility with appropriate resources:   Identify barriers to discharge with patient and caregiver   Arrange for needed discharge resources and transportation as appropriate   Identify discharge learning needs (meds, wound care, etc)   Arrange for interpreters to assist at discharge as needed   Refer to discharge planning if patient needs post-hospital services based on physician order or complex needs related to functional status, cognitive ability or social support system     Problem: Safety - Adult  Goal: Free from fall injury  Outcome: Completed

## 2024-08-13 NOTE — DISCHARGE SUMMARY
[facility] Cardiology Discharge Summary     Patient ID:  Anselmo Wilkerson  [id number]  69 y.o.  1955    Admit date: 8/12/2024    Discharge date:  8/13/2024    Admitting Physician: Hollis Penaloza MD     Discharge Physician: Dr. Echols    Admission Diagnoses: Chest pain [R07.9]  ACS (acute coronary syndrome) ([facility]) [I24.9]  Chest pain, unspecified type [R07.9]    Discharge Diagnoses:   Patient Active Problem List    Diagnosis    Chest pain    Coronary atherosclerosis of native coronary vessel    DM type 2 (diabetes mellitus, type 2) ([facility])       Cardiology Procedures this admission:  Diagnostic left heart catheterization, echocardiogram  Consults: none    Hospital Course: Patient presented to the ER with complaints of chest pain. Serial cardiac enzymes were done and remained negative.     Patient underwent cardiac catheterization by Dr. Penaloza.   [facility] results:   1.  Nonobstructive coronary artery disease noted mainly in the proximal diagonal  2.  Coronary vasomotor dysfunction noted  3.  Left ventricular systolic function is normal EF 60 to 65% with normal LVEDP  4.  Start treatment for coronary vasomotor dysfunction with calcium channel blocker    Patient tolerated the procedure well and was taken to the telemetry floor for recovery. The morning of discharge, patient was up feeling well without any complaints of chest pain or shortness of breath. Patient's right radial cath site was clean, dry and intact without hematoma or bruit. Patient's labs were WNL. Patient was seen and examined by Dr. Echols and determined stable and ready for discharge. Patient was instructed on the importance of medication compliance including taking aspirin. For maximized medical therapy for CAD, patient will continue CCB, ARB, and statin as well.  The patient will follow up with [facility] Cardiology -- Dr. Penaloza.     DISPOSITION: The patient is being discharged home in stable condition on a low saturated fat, low

## 2024-08-13 NOTE — PROGRESS NOTES
Discharge instructions reviewed with patient. Patient verbalized understanding. No questions at this time. IV x2 removed with tele box. Narcs counted with 2nd RN and returned to pt.

## 2024-08-16 LAB
CHOLEST SERPL-MCNC: NORMAL MG/DL
HDL SERPL-SCNC: NORMAL UMOL/L
HDLC SERPL-MCNC: NORMAL MG/DL
LDL SERPL-SCNC: NORMAL NMOL/L
TRIGL SERPL-MCNC: NORMAL MG/DL

## 2024-08-26 ENCOUNTER — TELEPHONE (OUTPATIENT)
Age: 69
End: 2024-08-26

## 2024-08-26 NOTE — TELEPHONE ENCOUNTER
Called and lvm for patient in regards to calling back at earliest convince to schedule follow up.

## 2024-08-26 NOTE — TELEPHONE ENCOUNTER
----- Message from Zandra ZHU sent at 8/14/2024  8:45 AM EDT -----    ----- Message -----  From: Surjit Barker APRN - CNP  Sent: 8/13/2024   7:58 AM EDT  To: #    Can you please schedule this patient with Dr Penaloza for a follow up in 2-4 weeks post hospitalization for [facility]    BRITANY You CNP  08/13/24  7:57 AM   Clindamycin Counseling: I counseled the patient regarding use of clindamycin as an antibiotic for prophylactic and/or therapeutic purposes. Clindamycin is active against numerous classes of bacteria, including skin bacteria. Side effects may include nausea, diarrhea, gastrointestinal upset, rash, hives, yeast infections, and in rare cases, colitis.

## 2024-08-27 ENCOUNTER — TELEPHONE (OUTPATIENT)
Age: 69
End: 2024-08-27

## 2024-08-27 NOTE — TELEPHONE ENCOUNTER
Seen in hospital  8/12 and medication Amlodipine 5mg was added.He says the medication is causing fatigue and dizziness.BP is averaging 120-140/80's and HR 70-80's range.Heart cath was good at hospital.The only thing new is the Amlodipine.He is still SOB but denies any CP.Only other BP med is Losartan 50mg qday.He will do orthostatics.    Lying /74 hr=81,sitting 138/83 hr=85,standing 143/84 hr=95.  He is asking if the Amlodipine can be causing weakness/dizzy headed feeling?

## 2024-08-27 NOTE — TELEPHONE ENCOUNTER
Hollis Penaloza MD [employer] now (12:31 PM)       Amlodipine would have been to treat coronary vasomotor dysfunction he should stay on the amlodipine and cut his losartan in half   Pt.notified of MD response and v/u.

## 2024-08-27 NOTE — TELEPHONE ENCOUNTER
Pt states that the amlodipine is making him really weak and dizzy every time he takes it and he's still having fluttering in his heart

## 2024-09-03 ENCOUNTER — TELEPHONE (OUTPATIENT)
Age: 69
End: 2024-09-03

## 2024-09-03 DIAGNOSIS — R06.00 DYSPNEA: Primary | ICD-10-CM

## 2024-09-03 NOTE — TELEPHONE ENCOUNTER
Pt taking amLODIPine (NORVASC) 5 MG tablet just started taking it since 8/13/24.  Intermittent SOB, dizzy, tired. Upon rising or bending over feels like he is going to pass out. Strokes run in the family and wants to know what test can be done to be sure everything is ok.    Wants to be seen sooner than October.

## 2024-09-03 NOTE — TELEPHONE ENCOUNTER
Pt c/o orthopnea, fluttering, and dizziness. States that when he \"stands up or bends over he feels like passing out\".     Pt states there is a family history of \"Carotid artery disease\".     /81 and HR 88    Pt is requesting that Dr. Penaloza order a carotid ultrasound.

## 2024-09-03 NOTE — TELEPHONE ENCOUNTER
Pt informed that carotid ultrasound would be ordered. Pt requested earlier appt in [place] office. Pt scheduled for 10/10/24 at 4pm in [place]. Pt does not wish to go to [place].

## 2024-09-26 NOTE — ED NOTES
Patient is resting on stretcher. Patient is on cardiac monitor, cycling vital signs, and continuous pulse ox. Patient denies needs at this time. Stretcher in low position. Side rails x 2 for safety. Call light within reach. Will continue to monitor. [Future Reassessment of Pain Scale] : Future reassessment of pain scale    [Medication(s)] : Medication(s) [FreeTextEntry1] : 77 year old male s/p right nephroureterectomy June 2022 with pT3 in renal pelvis (10% squamous differentiation) and pTa in the ureter and has since had several bouts of T1HG in the bladder July 2022, Feb 2023. 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 At initial appointment, Dr. Mccall discussed his cancer status and further management. Based on AUA and EAU guideline subjects with upper tract tumors of the renal pelvis and ureter(s) must meet a high risk assessment defined as: tumor 1cm and/or hydronephrosis and/or high grade pathology and/or multifocal disease, where a radical NU approach to treat localized disease is warranted, followed by adjuvant immunotherapy. However, he adamantly declined any surgery given his solitary kidney and the result of dialysis. He is cisplatin ineligible. The potential regimen, pembrolizumab and enfortumab vedotin could induce 73% response including 15% CR rate. His T1HG bladder cancer may be treated with both combination. He will be monitored by Dr. Starkey for surveillance cystoscopy and ureteroscopy. Potential AEs of immunotherapy and enfortumab vedotin were discussed and patient signed consent. He started treatment with pembrolizumab and padcev on 10/18/2023. 12/14/23 CT CAP showed New 3 mm nodule left lung lower lobe. No adenopathy in the thorax, abdomen or pelvis. No left hydronephrosis and left mid ureteral lesion. 5/15 MRI abdomen and pelvis and 5/21/24 CT chest showed LISETTE.   Plan Renal pelvis UCC, high grade  - Per Dr. Mccall's previous notes, we will proceed with Pembrolizumab alone at this time given no evidence of recurrent disease on his 8/8/24 cysto/ureteroscopy. Will continue Keytruda until two years in Oct 2025 to consider stopping Keytruda if he still has CR. - S/p cystoscopy and ureteroscopy on 8/8/24. Brief operative note states no sign of recurrent urothelial carcinoma. - May proceed to cycle#2 of Keytruda only 9/25/24  - 5/15/24 CT Chest: resolved left lower lobe nodule that was new on 12/27/2023. No new lung nodule. - 5/21/24 MR AP: no evidence of new or recurrent neoplasm  - Repeat CT Chest with no contrast and MR ABD/Pelvis images in September/Oct. pt to call to schedule   Diabetes  - Pembrolizumab associated hyperglycemia - Continue to follow with Dr. Ramos, next due 10/16/24. Pt advised to request an earlier appointment.   Neuropathy  - endorses consistent numbness/tingling in fingers and toes  - continue to monitor.  Insomnia due to uncontrolled Blood glucose levels-  - Recurrent insomnia. Has been using PRN diazepam 5mg qhs with good effects. However, not currently using given period of extreme hypoglycemia.  Pt understands not to take diazepam with oxycodone or any other sedating medications and not drive or operate machinery.   Chronic low back pain  - this pain is chronic and is not cancer related pain, he has been prescribed PRN oxycodone 10mg q8h by another provider. Continue follow with his PCP / NSGY to discuss further management   - 5/15 MR L spine: No evidence of bony metastatic disease. - pt seen by Ortho spine and referred to Pain Mgmt for lumbar stenosis/degenerative arthritis   RTC 10/16/24 to see Dr. Mccall

## 2024-10-22 ENCOUNTER — OFFICE VISIT (OUTPATIENT)
Age: 69
End: 2024-10-22
Payer: MEDICARE

## 2024-10-22 VITALS
SYSTOLIC BLOOD PRESSURE: 151 MMHG | DIASTOLIC BLOOD PRESSURE: 83 MMHG | BODY MASS INDEX: 29.25 KG/M2 | HEART RATE: 102 BPM | WEIGHT: 193 LBS | HEIGHT: 68 IN

## 2024-10-22 DIAGNOSIS — I25.10 ATHEROSCLEROSIS OF NATIVE CORONARY ARTERY OF NATIVE HEART WITHOUT ANGINA PECTORIS: Primary | ICD-10-CM

## 2024-10-22 PROCEDURE — 1123F ACP DISCUSS/DSCN MKR DOCD: CPT | Performed by: INTERNAL MEDICINE

## 2024-10-22 PROCEDURE — 99214 OFFICE O/P EST MOD 30 MIN: CPT | Performed by: INTERNAL MEDICINE

## 2024-10-22 RX ORDER — LOSARTAN POTASSIUM 50 MG/1
50 TABLET ORAL DAILY
Qty: 90 TABLET | Refills: 3 | Status: SHIPPED | OUTPATIENT
Start: 2024-10-22

## 2024-10-22 NOTE — PROGRESS NOTES
[facility], [address], [address]  [place] [address]  PHONE: [phone]    SUBJECTIVE: /HPI  Anselmo Wilkerson (1955) is a 69 y.o. male seen for a follow up visit regarding the following:   Specialty Problems          Cardiology Problems    Coronary vasospasm (HCC)        Chest pain        Benign hypertensive heart disease without heart failure        Coronary atherosclerosis of native coronary vessel         Patient presents for follow-up he was in the hospital in August with unstable angina found to be coronary vasomotor dysfunction by heart cath.  Today his biggest issues are his loss and confidence in me as a physician.  The entirety of the time was really discussing his dissatisfaction with the admission treatment and the lack of communication and follow-up appointment.  We went through the timeline of the admission heart cath and follow-up to try to answer any questions that patient may have.  He expresses interest in changing physicians which he can certainly change to any of the partners in the office.  I did walk through the timeline of his ER visit on August 12 his cath on August 12 my documentation and discussion of the cath results on August 12 and is discharged the next day.  The work several phone calls with the office and at least 1 medication adjustment during these phone calls.  But patient seems adamant that he felt not communicated with during the hospital experience.  I have provided him with my cell phone number and told him that he can call me anytime and that I am happy to continue seeing him if he wishes and happy to continue treating him for his cardiovascular issues    Past Medical History, Past Surgical History, Family history, Social History, and Medications were all reviewed with the patient today and updated as necessary.    Allergies   Allergen Reactions    Ticagrelor Shortness Of Breath     Ill-tempered    Clopidogrel Rash     Past Medical History:

## 2025-01-15 ENCOUNTER — LAB (OUTPATIENT)
Dept: UROLOGY | Age: 70
End: 2025-01-15

## 2025-01-15 DIAGNOSIS — C61 PROSTATE CANCER (HCC): ICD-10-CM

## 2025-01-16 LAB — PSA SERPL DL<=0.01 NG/ML-MCNC: 0.07 NG/ML (ref 0–4)

## 2025-01-22 ENCOUNTER — OFFICE VISIT (OUTPATIENT)
Dept: UROLOGY | Age: 70
End: 2025-01-22
Payer: MEDICARE

## 2025-01-22 DIAGNOSIS — C61 PROSTATE CANCER (HCC): Primary | ICD-10-CM

## 2025-01-22 LAB
BILIRUBIN, URINE, POC: NEGATIVE
BLOOD URINE, POC: NEGATIVE
GLUCOSE URINE, POC: 100 MG/DL
KETONES, URINE, POC: NEGATIVE MG/DL
LEUKOCYTE ESTERASE, URINE, POC: NEGATIVE
NITRITE, URINE, POC: NEGATIVE
PH, URINE, POC: 5.5 (ref 4.6–8)
PROTEIN,URINE, POC: NEGATIVE MG/DL
SPECIFIC GRAVITY, URINE, POC: 1.03 (ref 1–1.03)
URINALYSIS CLARITY, POC: NORMAL
URINALYSIS COLOR, POC: NORMAL
UROBILINOGEN, POC: NORMAL MG/DL

## 2025-01-22 PROCEDURE — 1159F MED LIST DOCD IN RCRD: CPT | Performed by: UROLOGY

## 2025-01-22 PROCEDURE — 99213 OFFICE O/P EST LOW 20 MIN: CPT | Performed by: UROLOGY

## 2025-01-22 PROCEDURE — 81003 URINALYSIS AUTO W/O SCOPE: CPT | Performed by: UROLOGY

## 2025-01-22 PROCEDURE — 1123F ACP DISCUSS/DSCN MKR DOCD: CPT | Performed by: UROLOGY

## 2025-01-22 PROCEDURE — 1160F RVW MEDS BY RX/DR IN RCRD: CPT | Performed by: UROLOGY

## 2025-01-22 NOTE — PROGRESS NOTES
[facility]  [address] [place] [address]  [phone]    Ansemlo Wilkerson  : 1955     HPI   69 y.o., male returns in follow up for CaP.  S/P RALP on 22.  Path showed Albert 4+3, T2N0 with a focal positive R margin.  He has done well post op.  Cont to report 1 ppd YUKI (slightly improved since last wk).  Reports headaches with Cialis.  Remains on Viagra for rehab.  Rarely active due to wife's medical conditions.  PSA was und on 22; 0.008 on 23; 0.016 on 23; 0.032 on 24; 0.049 on 7/15/24 and is now 0.068 on 1/15/25.       Past Medical History:   Diagnosis Date    Arthritis     Benign hypertensive heart disease without heart failure 2016    Chronic pain     chronic back pain    Coronary atherosclerosis of native coronary vessel 2016    Stress test 2021 EF 69%    Diabetes (HCC)     Type 2 po and insulin av -150 can Hypo below 80    DM type 2 (diabetes mellitus, type 2) ([facility]) 2016    Dyspnea     Hypertension     managed with meds    Malignant neoplasm of prostate (HCC) 2022    Thyroid disease     hypo- managed with med     Past Surgical History:   Procedure Laterality Date    CARDIAC PROCEDURE N/A 2024    Left heart cath / coronary angiography performed by Hollis Penaloza MD at [facility] CARDIAC CATH LAB    CHOLECYSTECTOMY      NEUROLOGICAL SURGERY  2017    C5 Fusion    CO UNLISTED PROCEDURE ABDOMEN PERITONEUM & OMENTUM      GALLBLADDER    CO UNLISTED PROCEDURE CARDIAC SURGERY      stents in . (a total of 3 heart stents last one placed ) sees [facility]    PROSTATECTOMY N/A 2022    PROSTATECTOMY LAPAROSCOPIC ROBOTIC/ bilateral lymph node dissection performed by Roverto Marti DO at [facility] MAIN OR     Current Outpatient Medications   Medication Sig Dispense Refill    losartan (COZAAR) 50 MG tablet Take 1 tablet by mouth daily 90 tablet 3    amLODIPine (NORVASC) 5 MG tablet Take 1 tablet by mouth daily 30

## 2025-07-15 ENCOUNTER — LAB (OUTPATIENT)
Dept: UROLOGY | Age: 70
End: 2025-07-15

## 2025-07-15 DIAGNOSIS — C61 PROSTATE CANCER (HCC): ICD-10-CM

## 2025-07-16 LAB — PSA SERPL DL<=0.01 NG/ML-MCNC: 0.1 NG/ML (ref 0–4)

## 2025-07-22 ENCOUNTER — OFFICE VISIT (OUTPATIENT)
Dept: UROLOGY | Age: 70
End: 2025-07-22
Payer: MEDICARE

## 2025-07-22 DIAGNOSIS — C61 PROSTATE CANCER (HCC): Primary | ICD-10-CM

## 2025-07-22 LAB
BILIRUBIN, URINE, POC: NEGATIVE
BLOOD URINE, POC: NEGATIVE
GLUCOSE URINE, POC: 100 MG/DL
KETONES, URINE, POC: NEGATIVE MG/DL
LEUKOCYTE ESTERASE, URINE, POC: NEGATIVE
NITRITE, URINE, POC: NEGATIVE
PH, URINE, POC: 6 (ref 4.6–8)
PROTEIN,URINE, POC: NEGATIVE MG/DL
SPECIFIC GRAVITY, URINE, POC: 1.03 (ref 1–1.03)
URINALYSIS CLARITY, POC: NORMAL
URINALYSIS COLOR, POC: NORMAL
UROBILINOGEN, POC: NORMAL MG/DL

## 2025-07-22 PROCEDURE — 1160F RVW MEDS BY RX/DR IN RCRD: CPT | Performed by: UROLOGY

## 2025-07-22 PROCEDURE — 1159F MED LIST DOCD IN RCRD: CPT | Performed by: UROLOGY

## 2025-07-22 PROCEDURE — 99213 OFFICE O/P EST LOW 20 MIN: CPT | Performed by: UROLOGY

## 2025-07-22 PROCEDURE — 1123F ACP DISCUSS/DSCN MKR DOCD: CPT | Performed by: UROLOGY

## 2025-07-22 PROCEDURE — 81003 URINALYSIS AUTO W/O SCOPE: CPT | Performed by: UROLOGY

## 2025-07-22 NOTE — PROGRESS NOTES
[facility]  [address] [place] [address]  [phone]    Anselmo Wilkerson  : 1955     HPI   70 y.o., male returns in follow up for CaP.  S/P RALP on 22.  Path showed Albert 4+3, T2N0 with a focal positive R margin.  He has done well post op.  Cont to report 1 ppd YUKI (slightly improved since last wk).  Reports headaches with Cialis.  Remains on Viagra for rehab.  Rarely active due to wife's medical conditions.  PSA was und on 22; 0.008 on 23; 0.016 on 23; 0.032 on 24; 0.049 on 7/15/24; 0.068 on 1/15/25 and is now 0.095 on 7/15/25.      Past Medical History:   Diagnosis Date    Arthritis     Benign hypertensive heart disease without heart failure 2016    Chronic pain     chronic back pain    Coronary atherosclerosis of native coronary vessel 2016    Stress test 2021 EF 69%    Diabetes (HCC)     Type 2 po and insulin av -150 can Hypo below 80    DM type 2 (diabetes mellitus, type 2) ([facility]) 2016    Dyspnea     Hypertension     managed with meds    Malignant neoplasm of prostate (HCC) 2022    Thyroid disease     hypo- managed with med     Past Surgical History:   Procedure Laterality Date    CARDIAC PROCEDURE N/A 2024    Left heart cath / coronary angiography performed by Hollis Penaloza MD at [facility] CARDIAC CATH LAB    CHOLECYSTECTOMY      NEUROLOGICAL SURGERY  2017    C5 Fusion    IN UNLISTED PROCEDURE ABDOMEN PERITONEUM & OMENTUM      GALLBLADDER    IN UNLISTED PROCEDURE CARDIAC SURGERY      stents in . (a total of 3 heart stents last one placed ) sees [facility]    PROSTATECTOMY N/A 2022    PROSTATECTOMY LAPAROSCOPIC ROBOTIC/ bilateral lymph node dissection performed by Roverto Marti DO at [facility] MAIN OR     Current Outpatient Medications   Medication Sig Dispense Refill    losartan (COZAAR) 50 MG tablet Take 1 tablet by mouth daily 90 tablet 3    amLODIPine (NORVASC) 5 MG tablet Take 1 tablet by

## (undated) DEVICE — CLIP INT 12MM BLK SGL ABSRB LIG LAPRO

## (undated) DEVICE — GLIDESHEATH SLENDER STAINLESS STEEL KIT: Brand: GLIDESHEATH SLENDER

## (undated) DEVICE — HOLDER PRB URONAV

## (undated) DEVICE — TROCAR: Brand: KII FIOS FIRST ENTRY

## (undated) DEVICE — STERILE PACKED. BORE DIAMETER 1.6 MM; ANGLE OF INSERTION 19° TO THE LONG AXIS OF THE TRANSDUCER: Brand: SINGLE-USE BIPLANE BIOPSY GUIDE

## (undated) DEVICE — CATHETER DIAG 5FR L100CM LUMN ID0.047IN JL3.5 CRV 0 SIDE H

## (undated) DEVICE — SUTURE DEV SZ 2-0 WND CLSR ABSRB GS-22 VLOC COVIDIEN VLOCM2145

## (undated) DEVICE — SUTURE VCRL SZ 4-0 L27IN ABSRB UD L19MM PS-2 3/8 CIR PRIM J426H

## (undated) DEVICE — ROBOTIC PROSTATE: Brand: MEDLINE INDUSTRIES, INC.

## (undated) DEVICE — AIRSEAL 12 MM ACCESS PORT AND PALM GRIP OBTURATOR WITH BLADELESS OPTICAL TIP, 120 MM LENGTH: Brand: AIRSEAL

## (undated) DEVICE — SUTURE ABSORBABLE BRAIDED 2-0 CT-1 27 IN UD VICRYL J259H

## (undated) DEVICE — SUTURE V-LOC 90 3-0 L6IN ABSRB UD CV-23 L17MM 1/2 CIR VLOCM1904

## (undated) DEVICE — SOLUTION IRRIG 1000ML STRL H2O USP PLAS POUR BTL

## (undated) DEVICE — ANGIOGRAPHIC CATHETER: Brand: IMPULSE™

## (undated) DEVICE — SUTURE ETHLN SZ 2-0 L18IN NONABSORBABLE BLK L26MM PS 3/8 585H

## (undated) DEVICE — PAD PT POS 36 IN SURGYPAD DISP

## (undated) DEVICE — BLADE CLIPPER GEN PURP NS

## (undated) DEVICE — ARM DRAPE

## (undated) DEVICE — CATHETER 5FR CORDIS PIG 145DEG 110CM

## (undated) DEVICE — Z CONVERTED USE 2421973 CONTAINER SPEC 60/30ML 10% FRMLN POLYPR PREFIL

## (undated) DEVICE — DRAPE TWL SURG 16X26IN BLU ORB04] ALLCARE INC]

## (undated) DEVICE — COLUMN DRAPE

## (undated) DEVICE — KIT BX PROST 20ML VI PREFIL W 10ML 10% NEUT BUFF FRMLN LEAK

## (undated) DEVICE — 260 CM J TIP WIRE .035

## (undated) DEVICE — MAX-CORE® DISPOSABLE CORE BIOPSY INSTRUMENT, 18G X 25CM: Brand: MAX-CORE

## (undated) DEVICE — BLADELESS OBTURATOR: Brand: WECK VISTA

## (undated) DEVICE — TIP COVER ACCESSORY

## (undated) DEVICE — COVER PRB W1XL11.8IN ENDOCAVITY CLR E BND NEOGUARD

## (undated) DEVICE — CANNULA SEAL

## (undated) DEVICE — CLIP INT L POLYMER LOK LIG HEM O LOK

## (undated) DEVICE — BAND COMPR L24CM REG CLR PLAS HEMSTAT EXT HK AND LOOP RETEN

## (undated) DEVICE — SOLUTION IV 1000ML 0.9% SOD CHL PH 5 INJ USP VIAFLX PLAS